# Patient Record
Sex: FEMALE | Race: WHITE | NOT HISPANIC OR LATINO | Employment: FULL TIME | URBAN - METROPOLITAN AREA
[De-identification: names, ages, dates, MRNs, and addresses within clinical notes are randomized per-mention and may not be internally consistent; named-entity substitution may affect disease eponyms.]

---

## 2017-06-01 ENCOUNTER — TRANSFERRED RECORDS (OUTPATIENT)
Dept: HEALTH INFORMATION MANAGEMENT | Facility: CLINIC | Age: 47
End: 2017-06-01

## 2017-06-01 LAB — PAP SMEAR - HIM PATIENT REPORTED: NEGATIVE

## 2017-07-26 ENCOUNTER — TRANSFERRED RECORDS (OUTPATIENT)
Dept: HEALTH INFORMATION MANAGEMENT | Facility: CLINIC | Age: 47
End: 2017-07-26

## 2017-07-26 LAB
PAP-ABSTRACT: NORMAL
PHQ9 SCORE: 11
TSH SERPL-ACNC: 4.32 MIU/ML (ref 0.55–4.78)

## 2017-11-29 ENCOUNTER — OFFICE VISIT (OUTPATIENT)
Dept: FAMILY MEDICINE | Facility: CLINIC | Age: 47
End: 2017-11-29
Payer: COMMERCIAL

## 2017-11-29 VITALS
HEART RATE: 99 BPM | SYSTOLIC BLOOD PRESSURE: 106 MMHG | HEIGHT: 64 IN | TEMPERATURE: 98.3 F | DIASTOLIC BLOOD PRESSURE: 69 MMHG | OXYGEN SATURATION: 97 % | BODY MASS INDEX: 35.89 KG/M2 | WEIGHT: 210.2 LBS

## 2017-11-29 DIAGNOSIS — G56.03 BILATERAL CARPAL TUNNEL SYNDROME: ICD-10-CM

## 2017-11-29 DIAGNOSIS — M76.62 ACHILLES TENDONITIS, BILATERAL: ICD-10-CM

## 2017-11-29 DIAGNOSIS — F43.10 PTSD (POST-TRAUMATIC STRESS DISORDER): ICD-10-CM

## 2017-11-29 DIAGNOSIS — M76.61 ACHILLES TENDONITIS, BILATERAL: ICD-10-CM

## 2017-11-29 DIAGNOSIS — F32.0 MILD MAJOR DEPRESSION (H): Primary | ICD-10-CM

## 2017-11-29 DIAGNOSIS — F41.9 ANXIETY: ICD-10-CM

## 2017-11-29 PROCEDURE — 99214 OFFICE O/P EST MOD 30 MIN: CPT | Performed by: PHYSICIAN ASSISTANT

## 2017-11-29 ASSESSMENT — ANXIETY QUESTIONNAIRES
1. FEELING NERVOUS, ANXIOUS, OR ON EDGE: MORE THAN HALF THE DAYS
IF YOU CHECKED OFF ANY PROBLEMS ON THIS QUESTIONNAIRE, HOW DIFFICULT HAVE THESE PROBLEMS MADE IT FOR YOU TO DO YOUR WORK, TAKE CARE OF THINGS AT HOME, OR GET ALONG WITH OTHER PEOPLE: VERY DIFFICULT
5. BEING SO RESTLESS THAT IT IS HARD TO SIT STILL: NEARLY EVERY DAY
6. BECOMING EASILY ANNOYED OR IRRITABLE: NEARLY EVERY DAY
GAD7 TOTAL SCORE: 19
7. FEELING AFRAID AS IF SOMETHING AWFUL MIGHT HAPPEN: MORE THAN HALF THE DAYS
2. NOT BEING ABLE TO STOP OR CONTROL WORRYING: NEARLY EVERY DAY
3. WORRYING TOO MUCH ABOUT DIFFERENT THINGS: NEARLY EVERY DAY

## 2017-11-29 ASSESSMENT — PATIENT HEALTH QUESTIONNAIRE - PHQ9
5. POOR APPETITE OR OVEREATING: NEARLY EVERY DAY
SUM OF ALL RESPONSES TO PHQ QUESTIONS 1-9: 21

## 2017-11-29 NOTE — LETTER
My Depression Action Plan  Name: Elly Golden   Date of Birth 1970  Date: 11/29/2017    My doctor: Guille [facility] [place]   My clinic: [facility] [address] [id number] [phone]          GREEN    ZONE   Good Control    What it looks like:     Things are going generally well. You have normal up s and down s. You may even feel depressed from time to time, but bad moods usually last less than a day.   What you need to do:  1. Continue to care for yourself (see self care plan)  2. Check your depression survival kit and update it as needed  3. Follow your physician s recommendations including any medication.  4. Do not stop taking medication unless you consult with your physician first.           YELLOW         ZONE Getting Worse    What it looks like:     Depression is starting to interfere with your life.     It may be hard to get out of bed; you may be starting to isolate yourself from others.    Symptoms of depression are starting to last most all day and this has happened for several days.     You may have suicidal thoughts but they are not constant.   What you need to do:     1. Call your care team, your response to treatment will improve if you keep your care team informed of your progress. Yellow periods are signs an adjustment may need to be made.     2. Continue your self-care, even if you have to fake it!    3. Talk to someone in your support network    4. Open up your depression survival kit           RED    ZONE Medical Alert - Get Help    What it looks like:     Depression is seriously interfering with your life.     You may experience these or other symptoms: You can t get out of bed most days, can t work or engage in other necessary activities, you have trouble taking care of basic hygiene, or basic responsibilities, thoughts of suicide or death that will not go away, self-injurious behavior.     What you  need to do:  1. Call your care team and request a same-day appointment. If they are not available (weekends or after hours) call your local crisis line, emergency room or 911.      Electronically signed by: Esther Nichole, November 29, 2017    Depression Self Care Plan / Survival Kit    Self-Care for Depression  Here s the deal. Your body and mind are really not as separate as most people think.  What you do and think affects how you feel and how you feel influences what you do and think. This means if you do things that people who feel good do, it will help you feel better.  Sometimes this is all it takes.  There is also a place for medication and therapy depending on how severe your depression is, so be sure to consult with your medical provider and/ or Behavioral Health Consultant if your symptoms are worsening or not improving.     In order to better manage my stress, I will:    Exercise  Get some form of exercise, every day. This will help reduce pain and release endorphins, the  feel good  chemicals in your brain. This is almost as good as taking antidepressants!  This is not the same as joining a gym and then never going! (they count on that by the way ) It can be as simple as just going for a walk or doing some gardening, anything that will get you moving.      Hygiene   Maintain good hygiene (Get out of bed in the morning, Make your bed, Brush your teeth, Take a shower, and Get dressed like you were going to work, even if you are unemployed).  If your clothes don't fit try to get ones that do.    Diet  I will strive to eat foods that are good for me, drink plenty of water, and avoid excessive sugar, caffeine, alcohol, and other mood-altering substances.  Some foods that are helpful in depression are: complex carbohydrates, B vitamins, flaxseed, fish or fish oil, fresh fruits and vegetables.    Psychotherapy  I agree to participate in Individual Therapy (if recommended).    Medication  If prescribed  medications, I agree to take them.  Missing doses can result in serious side effects.  I understand that drinking alcohol, or other illicit drug use, may cause potential side effects.  I will not stop my medication abruptly without first discussing it with my provider.    Staying Connected With Others  I will stay in touch with my friends, family members, and my primary care provider/team.    Use your imagination  Be creative.  We all have a creative side; it doesn t matter if it s oil painting, sand castles, or mud pies! This will also kick up the endorphins.    Witness Beauty  (AKA stop and smell the roses) Take a look outside, even in mid-winter. Notice colors, textures. Watch the squirrels and birds.     Service to others  Be of service to others.  There is always someone else in need.  By helping others we can  get out of ourselves  and remember the really important things.  This also provides opportunities for practicing all the other parts of the program.    Humor  Laugh and be silly!  Adjust your TV habits for less news and crime-drama and more comedy.    Control your stress  Try breathing deep, massage therapy, biofeedback, and meditation. Find time to relax each day.     My support system    Clinic Contact:  Phone number:    Contact 1:  Phone number:    Contact 2:  Phone number:    [employer]/:  Phone number:    Therapist:  Phone number:    [facility] crisis center:    Phone number:    Other community support:  Phone number:

## 2017-11-29 NOTE — NURSING NOTE
"Chief Complaint   Patient presents with     New Patient     Musculoskeletal Problem       Initial /69 (BP Location: Left arm, Patient Position: Chair, Cuff Size: Adult Large)  Pulse 99  Temp 98.3  F (36.8  C) (Oral)  Ht 5' 4.17\" (1.63 m)  Wt 210 lb 3.2 oz (95.3 kg)  SpO2 97%  Breastfeeding? No  BMI 35.89 kg/m2 Estimated body mass index is 35.89 kg/(m^2) as calculated from the following:    Height as of this encounter: 5' 4.17\" (1.63 m).    Weight as of this encounter: 210 lb 3.2 oz (95.3 kg).  Medication Reconciliation: complete     LYNDA Roman MA      "

## 2017-11-29 NOTE — PROGRESS NOTES
"  SUBJECTIVE:   Elly Golden is a 47 year old female who presents to clinic today for the following health issues:      Joint Pain    Onset: Since summer    Description:   Location: right elbow, both hands  Character: Sharp, Dull ache and Cramping    Intensity: mild, moderate, severe    Progression of Symptoms: worse    Accompanying Signs & Symptoms:  Other symptoms: numbness, tingling and weakness of both hands    History:   Previous similar pain: no       Precipitating factors:   Trauma or overuse: no     Alleviating factors:  Improved by: nothing    Therapies Tried and outcome: Pt has been icing but does not seem to help      Patient is a new patient to the clinic here today.    Has had joint pain in general, but has been worsening.   She is losing her  \"I am losing my hands\" Even trying to lift her books up with her hands is difficult. Hard to life a mug at times.   Numbness/tingling at times. She does sleep with her hands clenched into her body. Hard to do anything over her head. Minimal pain- usually more near the elbow than the hands.   Hands feel stiff first thing in the morning.   Has a history of having CTS.   Hasn't taken anything for the pain.     She also notes that she has trouble walking at times, feet are stiff. There is pain around the achilles tendon. Feels like there are nodules near the achilles. Worsens if she walks a lot. Trying different shoes. Has never done any physical therapy.      Has therapy animal (a chicken).  Not in counseling right now. Had been on wellbutrin and zoloft in the past and did not like how she felt on it. \"I felt paranoid\". Didn't like how she felt.     Has been homeless since 12/2016. Had spent the summer living in a tent with her . Now moved up to the [facility]. She is currently \"nelsy it\" at a friends 1 bedroom [facility].  gets food stamps and does visit [facility] [place].     Normal pap smear 6/2017 in [place].       Problem list and histories reviewed & " "adjusted, as indicated.  Additional history: as documented    Patient Active Problem List   Diagnosis     Anxiety     PTSD (post-traumatic stress disorder)     Mild major depression (H)     Past Surgical History:   Procedure Laterality Date     GYN SURGERY      C section x2       Social History   Substance Use Topics     Smoking status: Never Smoker     Smokeless tobacco: Never Used     Alcohol use No     Family History   Problem Relation Age of Onset     Unknown/Adopted Mother      Unknown/Adopted Father      Coronary Artery Disease Maternal Grandfather      Hypertension Maternal Grandfather              Reviewed and updated as needed this visit by clinical staffTobacco  Allergies  Meds  Problems  Med Hx  Surg Hx  Fam Hx  Soc Hx        Reviewed and updated as needed this visit by Provider  Tobacco  Allergies  Meds  Problems  Med Hx  Surg Hx  Fam Hx  Soc Hx          ROS:  Constitutional, HEENT, cardiovascular, pulmonary, gi and gu systems are negative, except as otherwise noted.      OBJECTIVE:   /69 (BP Location: Left arm, Patient Position: Chair, Cuff Size: Adult Large)  Pulse 99  Temp 98.3  F (36.8  C) (Oral)  Ht 5' 4.17\" (1.63 m)  Wt 210 lb 3.2 oz (95.3 kg)  SpO2 97%  Breastfeeding? No  BMI 35.89 kg/m2  Body mass index is 35.89 kg/(m^2).  GENERAL: healthy, alert and no distress  RESP: lungs clear to auscultation - no rales, rhonchi or wheezes  CV: regular rate and rhythm, normal S1 S2, no S3 or S4, no murmur, click or rub,   MS: no gross musculoskeletal defects noted, no edema- strength 4/5 bilaterally. Positive Tinel's sign bilaterally. Positive Phalen's test. full range of motion  Pain with palpation at the insertion of the bilateral achilles tendon. Normal gait. No swelling in the feet.   SKIN: no suspicious lesions or rashes  NEURO: Normal strength and tone, mentation intact and speech normal  Psych: pressured speech, anxious manner, tapping of leg.     Diagnostic Test " Results:  none     ASSESSMENT/PLAN:       ICD-10-CM    1. Mild major depression (H) F32.0 MENTAL HEALTH REFERRAL  - Adult; Outpatient Treatment; Individual/Couples/Family/Group Therapy/Health Psychology; [facility] [place]: [facility] [phone]; The scheduling team will contact you to schedule your appointment.  If you have any ...     CARE COORDINATION REFERRAL   2. Anxiety F41.9 MENTAL HEALTH REFERRAL  - Adult; Outpatient Treatment; Individual/Couples/Family/Group Therapy/Health Psychology; [facility] [place]: [facility] [phone]; The scheduling team will contact you to schedule your appointment.  If you have any ...     CARE COORDINATION REFERRAL   3. PTSD (post-traumatic stress disorder) F43.10 MENTAL HEALTH REFERRAL  - Adult; Outpatient Treatment; Individual/Couples/Family/Group Therapy/Health Psychology; [facility] [place]: [facility] [phone]; The scheduling team will contact you to schedule your appointment.  If you have any ...     CARE COORDINATION REFERRAL   4. Achilles tendonitis, bilateral M76.61 [facility] PT, HAND, AND CHIROPRACTIC REFERRAL    M76.62    5. Bilateral carpal tunnel syndrome G56.03 order for DME   Patient prefers to start with counseling for anxiety and depression.   Physical therapy for the achilles tendonitis.   Wear wrist splints at night for 6 weeks, if not improving could consider a surgical consult.     FUTURE APPOINTMENTS:       - 4-6 weeks if not improving with braces and therapy.     Esther Nichole PA-C  [facility]

## 2017-11-29 NOTE — MR AVS SNAPSHOT
After Visit Summary   11/29/2017    Elly Golden    MRN: [id number]           Patient Information     Date Of Birth          1970        Visit Information        Provider Department      11/29/2017 9:40 AM Esther Nichole PA-C [facility]        Today's Diagnoses     Mild major depression (H)    -  1    Anxiety        PTSD (post-traumatic stress disorder)        Achilles tendonitis, bilateral        Bilateral carpal tunnel syndrome          Care Instructions    Wear wrist splints at night.     Schedule with physical therapy.     Schedule for counseling.           Follow-ups after your visit        Additional Services     [facility] PT, HAND, AND CHIROPRACTIC REFERRAL       **This order will print in the [facility] Scheduling Office**    Physical Therapy, Hand Therapy and Chiropractic Care are available through:    *[facility]  *[facility]  *[facility]    Call one number to schedule at any of the above locations: [phone].    Your provider has referred you to: Physical Therapy at [facility] or [facility]    Indication/Reason for Referral: Bilateral achilles tendonitis.   Onset of Illness: months  Therapy Orders: Evaluate and Treat  Special Programs: None  Special Request: Britany Das      Additional Comments for the Therapist or Chiropractor:     Please be aware that coverage of these services is subject to the terms and limitations of your health insurance plan.  Call member services at your health plan with any benefit or coverage questions.      Please bring the following to your appointment:    *Your personal calendar for scheduling future appointments  *Comfortable clothing            MENTAL HEALTH REFERRAL  - Adult; Outpatient Treatment; Individual/Couples/Family/Group Therapy/Health Psychology; [facility] [place] [place]: [facility] [phone]; The scheduling team will contact you to schedule your appointment.  If you have  "any ...       All scheduling is subject to the client's specific insurance plan & benefits, provider/location availability, and provider clinical specialities.  Please arrive 15 minutes early for your first appointment and bring your completed paperwork.    Please be aware that coverage of these services is subject to the terms and limitations of your health insurance plan.  Call member services at your health plan with any benefit or coverage questions.                            Who to contact     If you have questions or need follow up information about today's clinic visit or your schedule please contact [facility] directly at [phone].  Normal or non-critical lab and imaging results will be communicated to you by Bustlehart, letter or phone within 4 business days after the clinic has received the results. If you do not hear from us within 7 days, please contact the clinic through Snootlabt or phone. If you have a critical or abnormal lab result, we will notify you by phone as soon as possible.  Submit refill requests through Tripvisto or call your pharmacy and they will forward the refill request to us. Please allow 3 business days for your refill to be completed.          Additional Information About Your Visit        BustleharClickyreserva Information     Tripvisto lets you send messages to your doctor, view your test results, renew your prescriptions, schedule appointments and more. To sign up, go to [email or web address] . Click on \"Log in\" on the left side of the screen, which will take you to the Welcome page. Then click on \"Sign up Now\" on the right side of the page.     You will be asked to enter the access code listed below, as well as some personal information. Please follow the directions to create your username and password.     Your access code is: [email or web address]  Expires: 2018 10:23 AM     Your access code will  in 90 days. If you need help or a new code, please call your [facility] " "clinic or [phone].        Care EveryWhere ID     This is your Care EveryWhere ID. This could be used by other organizations to access your [facility] medical records  [id number]        Your Vitals Were     Pulse Temperature Height Pulse Oximetry Breastfeeding? BMI (Body Mass Index)    99 98.3  F (36.8  C) (Oral) 5' 4.17\" (1.63 m) 97% No 35.89 kg/m2       Blood Pressure from Last 3 Encounters:   11/29/17 106/69    Weight from Last 3 Encounters:   11/29/17 210 lb 3.2 oz (95.3 kg)              We Performed the Following     [facility] PT, HAND, AND CHIR[facility]RACTIC REFERRAL     MENTAL HEALTH REFERRAL  - Adult; Outpatient Treatment; Individual/Couples/Family/Group Therapy/Health Psychology; [facility]: [facility] [phone]; The scheduling team will contact you to schedule your appointment.  If you have any ...          Today's Medication Changes          These changes are accurate as of: 11/29/17 10:23 AM.  If you have any questions, ask your nurse or doctor.               Start taking these medicines.        Dose/Directions    order for DME   Used for:  Bilateral carpal tunnel syndrome   Started by:  Esther Nichole PA-C        Equipment being ordered: Bilateral wrist splints-no thumb.   Quantity:  2 each   Refills:  0            Where to get your medicines      Some of these will need a paper prescription and others can be bought over the counter.  Ask your nurse if you have questions.     Bring a paper prescription for each of these medications     order for DME                Primary Care Provider Office Phone # Fax #    [facility] [phone]       [address] [place] [id number]        Equal Access to Services     LOUISA LLOYD : Corine ordoñez Sosaman, waaxda luqadaha, qaybta kaalmacharlotte perry. So [facility] [phone].    ATENCIÓN: Si habla español, tiene a cordova disposición servicios gratuitos de asistencia " lingüística. Opal al [phone].    We comply with applicable federal civil rights laws and [place] laws. We do not discriminate on the basis of race, color, national origin, age, disability, sex, sexual orientation, or gender identity.            Thank you!     Thank you for choosing [facility]  for your care. Our goal is always to provide you with excellent care. Hearing back from our patients is one way we can continue to improve our services. Please take a few minutes to complete the written survey that you may receive in the mail after your visit with us. Thank you!             Your Updated Medication List - Protect others around you: Learn how to safely use, store and throw away your medicines at www.disposemymeds.org.          This list is accurate as of: 11/29/17 10:23 AM.  Always use your most recent med list.                   Brand Name Dispense Instructions for use Diagnosis    levonorgestrel 20 MCG/24HR IUD    MIRENA     1 each by Intrauterine route once        order for DME     2 each    Equipment being ordered: Bilateral wrist splints-no thumb.    Bilateral carpal tunnel syndrome

## 2017-11-30 ASSESSMENT — ANXIETY QUESTIONNAIRES: GAD7 TOTAL SCORE: 19

## 2017-12-05 ENCOUNTER — CARE COORDINATION (OUTPATIENT)
Dept: CARE COORDINATION | Facility: CLINIC | Age: 47
End: 2017-12-05

## 2017-12-05 NOTE — PROGRESS NOTES
Clinic Care Coordination Contact--Social Work Initial Call/Assessment  Care Team Conversations    Psychosocial: Per chart review, Patient has been homeless since December of 2016.  She currently sleeps on floor or friends 1 bed room condo, her spouse obtains food stamps and they visit [place].  She was living in a tent this past summer with her .  Patient has a therapy animal, a chicken.  She is not currently in counseling but is accepting for anxiety and depression.  A mental health referral done by provider.  Patient had reported she did not like taking Zoloft and Wellbutrin which she had taken in the past.      Current Medical Concerns/Status: Patient had reported worsening joint pain.  She had reported she is losing her  and holding a mug is often difficult.  She had reported intermittent numbness and tingling in both hands, that her hands are stiff first thing in the morning and that she has difficulty walking at times due to stiffness in her feet.  Patient reported she has not done PT and is not taking pain medications.  Patient reported that using ice has not been helpful.  Provider referred Patient to PT and to wear splint at night for 6 weeks.  If no improvement in 4-6 weeks, surgical consult is recommended.    Call to Patient for needs assessment ([phone]), per the , this # has been disconnected or is not in service.  Patient is homeless, SW will not send introduction letter until verification of current residence/mailing address     Plan: 1) SW will call Patient in 3-5 business days for introduction and needs assessment     GINGER Dolan, MSW   [phone]  12/5/2017 5:00 PM    Clinic Care Coordination Contact--Social Work Initial Call/Assessment  Care Team Conversations    Psychosocial: Per chart review, Patient has been homeless since December of 2016.  She currently sleeps on floor or friends 1 bed room condo, her spouse obtains food stamps and they visit [employer]  food [employer].  She was living in a tent this past summer with her .  Patient has a therapy animal, a chicken.  She is not currently in counseling but is accepting for anxiety and depression.  A mental health referral done by provider.  Patient had reported she did not like taking Zoloft and Wellbutrin which she had taken in the past.      Current Medical Concerns/Status: Patient had reported worsening joint pain.  She had reported she is losing her  and holding a mug is often difficult.  She had reported intermittent numbness and tingling in both hands, that her hands are stiff first thing in the morning and that she has difficulty walking at times due to stiffness in her feet.  Patient reported she has not done PT and is not taking pain medications.  Patient reported that using ice has not been helpful.  Provider referred Patient to PT and to wear splint at night for 6 weeks.  If no improvement in 4-6 weeks, surgical consult is recommended.    Second call to Patient for needs assessment ([phone]), per the , this # has been disconnected or is not in service.  Patient is homeless, SW will not send introduction letter until verification of current residence/mailing address     Plan: 1) SW has been unable to reach Patient, cannot send letter as Patient is technically homeless.  SW will not make further attempt to contact Patient, will update PCP and close to Care Coordination     Karen Florence, GINGER, MSW   [phone]  12/20/2017 4:54 PM

## 2017-12-05 NOTE — LETTER
Health Care Home - Access Care Plan    About Me  Patient Name:  Elly Cardenas    YOB: 1970  Age:                            47 year old   [place] MRN:         [id number] Telephone Information:     Home Phone [phone]   Mobile Not on file.       Address:    [address] [place] [address] Email address:  [email or web address]      Emergency Contact(s)  Name Relationship Lgl Grd Work Phone Home Phone Mobile Phone   1. CORAZON CARDENAS Significant ot*   [phone]    2. NO SECONDARY C*    home              Health Maintenance: Routine Health maintenance Reviewed: Not assessed    My Access Plan  Medical Emergency 911   Questions or concerns during clinic hours Primary Clinic Line, I will call the clinic directly: Primary Clinic:  (unknown )   24 Hour Appointment Line [phone] or  [phone])  (toll free)   24 Hour Nurse Line [phone] (toll free)   Questions or concerns outside clinic hours 24 Hour Appointment Line, I will call the after-hours on-call line:   [place] [phone] or [phone]) (toll-free)   Preferred Urgent Care     Preferred Hospital Preferred Hospital:  (unknown)   Preferred Pharmacy No Pharmacies Listed   Behavioral Health Crisis Line The National Suicide Prevention Lifeline at 1-833.762.1449 or 079     My Care Team Members  Patient Care Team       Relationship Specialty Notifications Start End    [facility] PCP - General Clinic  11/28/17     Phone: [phone] Fax: [phone]         [address] [place] [address]    Esther Nichole PA-C Physician Assistant Physician Assistant - Medical  12/5/17     Phone: [phone] Fax: [phone]         [address] [facility] [address]    Karen Florence [facility] Care Coordinator  - Clinical  12/5/17     Phone: [phone] Fax: [phone]            My Medical and Care Information  Problem  List   Patient Active Problem List   Diagnosis     Anxiety     PTSD (post-traumatic stress disorder)     Mild major depression (H)

## 2017-12-05 NOTE — LETTER
[facility] CARE COORDINATION  [address] [facility] [address]  Phone: [phone]      December 5, 2017      Elly Golden  [address]   [facility] [address]    Dear Elly,  I am the Clinic Care Coordinator that works with your primary care provider's clinic. I recently tried to call and was unable to reach you. Below is a description of what Clinic Care Coordination is and how I can further assist you.     The Clinic Care Coordinator role is a Registered Nurse and/or  who understands the health care system. The goal of Clinic Care Coordination is to help you manage your health and improve access to the [facility] in the most efficient manner.  The Registered Nurse can assist you in meeting your health care goals by providing education, coordinating services, and strengthening the communication among your providers. The  can assist you with financial, behavioral, psychosocial, and chemical dependency and counseling/psychiatric resources.    Please feel free to keep this letter and contact information to contact me at [phone] with any further questions or concerns that may arise. We at [facility] are focused on providing you with the highest-quality healthcare experience possible and that all starts with you.       Sincerely,     Karen Florence, GINGER, MSW    Clinical Care Coordination  [facility]  [phone]    Enclosed: I have enclosed a copy of a 24 Hour Access Plan. This has helpful phone numbers for you to call when needed. Please keep this in an easy to access place to use as needed.

## 2017-12-13 ENCOUNTER — THERAPY VISIT (OUTPATIENT)
Dept: PHYSICAL THERAPY | Facility: CLINIC | Age: 47
End: 2017-12-13
Payer: COMMERCIAL

## 2017-12-13 DIAGNOSIS — M25.579 PAIN IN JOINT, ANKLE AND FOOT, UNSPECIFIED LATERALITY: Primary | ICD-10-CM

## 2017-12-13 PROCEDURE — 97161 PT EVAL LOW COMPLEX 20 MIN: CPT | Mod: GP | Performed by: PHYSICAL THERAPIST

## 2017-12-13 PROCEDURE — 97110 THERAPEUTIC EXERCISES: CPT | Mod: GP | Performed by: PHYSICAL THERAPIST

## 2017-12-13 NOTE — PROGRESS NOTES
[facility] Initial Evaluation      Subjective:    Patient is a 47 year old female presenting with rehab right ankle/foot hpi. The history is provided by the patient.   Elly Golden is a 47 year old female with a bilateral ankles (R > L ) condition.  Condition occurred with:  A fall/slip and insidious onset.  Condition occurred: in the community.  This is a chronic condition  May / June.  Sprained R ankle.  Have dislocated R ankle before.  MD referral 12/5/2017..    Patient reports pain:  Posterior.  Radiates to:  Foot.  Pain is described as aching and sharp and is intermittent and reported as 3/10.  Associated symptoms:  Loss of motion/stiffness. Pain is worse in the P.M..  Symptoms are exacerbated by weight bearing, standing, ascending stairs, descending stairs, walking and bending/squatting and relieved by rest and other (shoes without backs).  Since onset symptoms are unchanged.        General health as reported by patient is good.  Pertinent medical history includes:  Mental illness and depression.  Medical allergies: yes (latex, augmentin).  Other surgeries include:  Other (2 c-sections).  Current medications:  None as reported by patient.  Current occupation is none.    Primary job tasks include:  Prolonged sitting and lifting (computer;  housework).    Barriers include:  None as reported by patient.    Red flags:  None as reported by patient.                        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  LIGAMENT TESTING: normal                PALPATION:     Left ankle tenderness not present at:   achilles tendon  Right ankle tenderness present at:   achilles tendon  EDEMA: Edema ankle: distal  achilles.                                                              General     ROS    Assessment/Plan:      Patient is a 47 year old female with both sides ankle complaints.    Patient has the following significant findings with corresponding treatment plan.                Diagnosis 1:  bilat achilles tendonitis  Pain -  hot/cold therapy, US, manual therapy, self management, education, directional preference exercise and home program  Decreased ROM/flexibility - manual therapy and therapeutic exercise  Inflammation - cold therapy and US  Decreased function - therapeutic activities    Therapy Evaluation Codes:   1) History comprised of:   Personal factors that impact the plan of care:      None.    Comorbidity factors that impact the plan of care are:      Depression and Mental illness.     Medications impacting care: None.  2) Examination of Body Systems comprised of:   Body structures and functions that impact the plan of care:      Ankle.   Activity limitations that impact the plan of care are:      Walking.  3) Clinical presentation characteristics are:   Stable/Uncomplicated.  4) Decision-Making    Low complexity using standardized patient assessment instrument and/or measureable assessment of functional outcome.  Cumulative Therapy Evaluation is: Low complexity.    Previous and current functional limitations:  (See Goal Flow Sheet for this information)    Short term and Long term goals: (See Goal Flow Sheet for this information)     Communication ability:  Patient appears to be able to clearly communicate and understand verbal and written communication and follow directions correctly.  Treatment Explanation - The following has been discussed with the patient:   RX ordered/plan of care  Anticipated outcomes  Possible risks and side effects  This patient would benefit from PT intervention to resume normal activities.   Rehab potential is good.    Frequency:  1 X week, once daily  Duration:  for 8  weeks  Discharge Plan:  Achieve all LTG.  Independent in home treatment program.  Reach maximal therapeutic benefit.    Please refer to the daily flowsheet for treatment today, total treatment time and time spent performing 1:1 timed codes.

## 2017-12-13 NOTE — MR AVS SNAPSHOT
After Visit Summary   12/13/2017    Elly Golden    MRN: [id number]           Patient Information     Date Of Birth          1970        Visit Information        Provider Department      12/13/2017 11:00 AM Boston Mack, PT [facility] [place] [facility]        Today's Diagnoses     Pain in joint, ankle and foot, unspecified laterality    -  1       Follow-ups after your visit        Your next 10 appointments already scheduled     Dec 19, 2017  2:40 PM CST   SHORT with Esther Nichole PA-C   [facility]    [address] [facility] [address]   [phone]            Dec 20, 2017 12:50 PM CST   KORY Extremity with Boston Mack PT   [facility] [place] [facility] ([facility])    [address] [facility] [address] [phone]            Jan 04, 2018  1:30 PM CST   [facility] with Alex Krue[facility]    [address] [facility] [address] [phone]              Who to contact     If you have questions or need follow up information about today's clinic visit or your schedule please contact [facility] directly at [phone].  Normal or non-critical lab and imaging results will be communicated to you by MyChart, letter or phone within 4 business days after the clinic has received the results. If you do not hear from us within 7 days, please contact the clinic through MyChart or phone. If you have a critical or abnormal lab result, we will notify you by phone as soon as possible.  Submit refill requests through [employer] or call your pharmacy and they will forward the refill request to us. Please allow 3 business days for your refill to be completed.          Additional Information About  Your Visit        [employer] Information     [employer] gives you secure access to your electronic health record. If you see a primary care provider, you can also send messages to your care team and make appointments. If you have questions, please call your primary care clinic.  If you do not have a primary care provider, please call [phone] and they will assist you.        Care EveryWhere ID     This is your Care EveryWhere ID. This could be used by other organizations to access your [facility] medical records  [id number]         Blood Pressure from Last 3 Encounters:   11/29/17 106/69    Weight from Last 3 Encounters:   11/29/17 95.3 kg (210 lb 3.2 oz)              We Performed the Following     HC PT EVAL, LOW COMPLEXITY     KORY INITIAL EVAL REPORT     THERAPEUTIC EXERCISES        Primary Care Provider Office Phone # Fax #    [facility] [phone]       [address] [place] [address]        Equal Access to Services     FELECIA LLOYD : Hadii aad ku hadasho Soomaali, waaxda luqadaha, qaybta kaalmada adeegyada, waxay idiin hayaan agustín bowden . So [facility] [phone].    ATENCIÓN: Si habla español, tiene a cordova disposición servicios gratuitos de asistencia lingüística. Chrisame al [phone].    We comply with applicable federal civil rights laws and [place] laws. We do not discriminate on the basis of race, color, national origin, age, disability, sex, sexual orientation, or gender identity.            Thank you!     Thank you for choosing [facility]  for your care. Our goal is always to provide you with excellent care. Hearing back from our patients is one way we can continue to improve our services. Please take a few minutes to complete the written survey that you may receive in the mail after your visit with us. Thank you!             Your Updated Medication List - Protect others around you: Learn how to safely  use, store and throw away your medicines at www.disposemymeds.org.          This list is accurate as of: 12/13/17 11:46 AM.  Always use your most recent med list.                   Brand Name Dispense Instructions for use Diagnosis    levonorgestrel 20 MCG/24HR IUD    MIRENA     1 each by Intrauterine route once        order for DME     2 each    Equipment being ordered: Bilateral wrist splints-no thumb.    Bilateral carpal tunnel syndrome

## 2017-12-19 ENCOUNTER — OFFICE VISIT (OUTPATIENT)
Dept: FAMILY MEDICINE | Facility: CLINIC | Age: 47
End: 2017-12-19
Payer: COMMERCIAL

## 2017-12-19 VITALS
TEMPERATURE: 98 F | WEIGHT: 212 LBS | DIASTOLIC BLOOD PRESSURE: 65 MMHG | OXYGEN SATURATION: 98 % | BODY MASS INDEX: 36.19 KG/M2 | SYSTOLIC BLOOD PRESSURE: 99 MMHG | HEART RATE: 65 BPM

## 2017-12-19 DIAGNOSIS — G56.03 BILATERAL CARPAL TUNNEL SYNDROME: Primary | ICD-10-CM

## 2017-12-19 DIAGNOSIS — F43.10 PTSD (POST-TRAUMATIC STRESS DISORDER): ICD-10-CM

## 2017-12-19 DIAGNOSIS — F32.0 MILD MAJOR DEPRESSION (H): ICD-10-CM

## 2017-12-19 PROCEDURE — 99213 OFFICE O/P EST LOW 20 MIN: CPT | Performed by: PHYSICIAN ASSISTANT

## 2017-12-19 ASSESSMENT — ANXIETY QUESTIONNAIRES
1. FEELING NERVOUS, ANXIOUS, OR ON EDGE: MORE THAN HALF THE DAYS
5. BEING SO RESTLESS THAT IT IS HARD TO SIT STILL: MORE THAN HALF THE DAYS
6. BECOMING EASILY ANNOYED OR IRRITABLE: NEARLY EVERY DAY
3. WORRYING TOO MUCH ABOUT DIFFERENT THINGS: NEARLY EVERY DAY
2. NOT BEING ABLE TO STOP OR CONTROL WORRYING: NEARLY EVERY DAY
GAD7 TOTAL SCORE: 17
IF YOU CHECKED OFF ANY PROBLEMS ON THIS QUESTIONNAIRE, HOW DIFFICULT HAVE THESE PROBLEMS MADE IT FOR YOU TO DO YOUR WORK, TAKE CARE OF THINGS AT HOME, OR GET ALONG WITH OTHER PEOPLE: VERY DIFFICULT
7. FEELING AFRAID AS IF SOMETHING AWFUL MIGHT HAPPEN: MORE THAN HALF THE DAYS

## 2017-12-19 ASSESSMENT — PATIENT HEALTH QUESTIONNAIRE - PHQ9
5. POOR APPETITE OR OVEREATING: MORE THAN HALF THE DAYS
SUM OF ALL RESPONSES TO PHQ QUESTIONS 1-9: 18

## 2017-12-19 NOTE — PROGRESS NOTES
"  SUBJECTIVE:   Elly Golden is a 47 year old female who presents to clinic today for the following health issues:    Patient is here to follow up on her arms. Pt states that it has not gotten better and the braces does not help at all.    Wakes up in the night and her elbows are throbbing and she has to take off the braces. Pain in both elbows, but more in the right. Difficult to  items - hurts in the elbow. Pain in \"soft tissue and in the join\". Pain 2-3/10, says she tolerates pain well. Has not tried ibuprofen or tylenol. Has not tried heat or ice.   Pain is worse when braces are one. Stiff and sore in the morning and continues to get worse throughout the day.     Has appointment schedule for counseling on January 4th. Still with housing concerns particularly d/t her emotional support rooster.     Problem list and histories reviewed & adjusted, as indicated.  Additional history: as documented    Patient Active Problem List   Diagnosis     Anxiety     PTSD (post-traumatic stress disorder)     Mild major depression (H)     Pain in joint, ankle and foot, unspecified laterality     Past Surgical History:   Procedure Laterality Date     GYN SURGERY      C section x2       Social History   Substance Use Topics     Smoking status: Never Smoker     Smokeless tobacco: Never Used     Alcohol use No     Family History   Problem Relation Age of Onset     Unknown/Adopted Mother      Unknown/Adopted Father      Coronary Artery Disease Maternal Grandfather      Hypertension Maternal Grandfather        Reviewed and updated as needed this visit by clinical staffTobacco  Allergies  Meds  Med Hx  Surg Hx  Fam Hx  Soc Hx      Reviewed and updated as needed this visit by Provider       ROS:  Constitutional, HEENT, cardiovascular, pulmonary, gi and gu systems are negative, except as otherwise noted.  OBJECTIVE:   BP 99/65 (BP Location: Left arm, Patient Position: Chair, Cuff Size: Adult Large)  Pulse 65  Temp 98  F " (36.7  C) (Oral)  Wt 212 lb (96.2 kg)  SpO2 98%  BMI 36.19 kg/m2  Body mass index is 36.19 kg/(m^2).  GENERAL: healthy, alert and no distress  MS: no gross musculoskeletal defects noted, no edema  MS: Upper extremity pain with palpation of lateral epicondyles bilaterally. Positive Phalen's and Tinel's test bilaterally. Strength 4/5 in hands bilaterally. ROM not limited by pain.   SKIN: no suspicious lesions or rashes  NEURO: Normal strength and tone, mentation intact and speech normal    Diagnostic Test Results:  none   ASSESSMENT/PLAN:       ICD-10-CM    1. Bilateral carpal tunnel syndrome G56.03 NEUROLOGY ADULT REFERRAL   2. PTSD (post-traumatic stress disorder) F43.10 CARE COORDINATION REFERRAL   3. Mild major depression (H) F32.0 CARE COORDINATION REFERRAL   Referral to neurology for EMG to help in management of carpal tunnel syndrome. Patient educated on benefits of wearing braces, but wanted to stop due to the increased elbow pain when wearing the. Discussed next steps and patient will schedule for the EMG.   Patient has scheduled an appointment for January 4th to meet with a counselor. Patient encouraged to talk with clinic , she was given her number to call.    CONSULTATION/REFERRAL to Neurology    JOSEF Donohue PA-C  [facility]  The student Tess GARCIA acted as a scribe and the encounter documented above was completely performed by myself and the documentation reflects the work I have performed today.   Esther Nichole PA-C

## 2017-12-19 NOTE — MR AVS SNAPSHOT
After Visit Summary   12/19/2017    Elly Golden    MRN: [id number]           Patient Information     Date Of Birth          1970        Visit Information        Provider Department      12/19/2017 2:40 PM Esther Nichole PA-C [facility]        Today's Diagnoses     Bilateral carpal tunnel syndrome    -  1       Follow-ups after your visit        Additional Services     NEUROLOGY ADULT REFERRAL       Your provider has referred you for the following:   [facility] [place] [facility] [phone]   [email or web address] and G: [place] [facility] [place] [phone]   [email or web address]    Please be aware that coverage of these services is subject to the terms and limitations of your health insurance plan.  Call member services at your health plan with any benefit or coverage questions.      Please bring the following with you to your appointment:    (1) Any X-Rays, CTs or MRIs which have been performed.  Contact the facility where they were done to arrange for  prior to your scheduled appointment.    (2) List of current medications  (3) This referral request   (4) Any documents/labs given to you for this referral                  Your next 10 appointments already scheduled     Dec 20, 2017 12:50 PM CST   KORY Extremity with Boston Mack PT   [facility] [place] [facility] ([facility])    [address] [facility] [id number] [phone]            Jan 04, 2018  1:30 PM CST   [facility] with REINA Holman   [facility]    [address] [facility] [id number] [phone]              Who to contact     If you have questions or need follow up information about today's clinic visit or your schedule please contact [facility] directly at [phone].  Normal or non-critical lab and imaging results will be communicated to you by MyChart, letter or phone within 4 business days after the clinic has received the results. If you do not hear from us within 7 days, please contact the clinic through [employer] or phone. If you have a critical or abnormal lab result, we will notify you by phone as soon as possible.  Submit refill requests through [employer] or call your pharmacy and they will forward the refill request to us. Please allow 3 business days for your refill to be completed.          Additional Information About Your Visit        [employer] Information     [employer] gives you secure access to your electronic health record. If you see a primary care provider, you can also send messages to your care team and make appointments. If you have questions, please call your primary care clinic.  If you do not have a primary care provider, please call [phone] and they will assist you.        Care EveryWhere ID     This is your Care EveryWhere ID. This could be used by other organizations to access your [facility] medical records  [id number]        Your Vitals Were     Pulse Temperature Pulse Oximetry BMI (Body Mass Index)          65 98  F (36.7  C) (Oral) 98% 36.19 kg/m2         Blood Pressure from Last 3 Encounters:   12/19/17 99/65   11/29/17 106/69    Weight from Last 3 Encounters:   12/19/17 212 lb (96.2 kg)   11/29/17 210 lb 3.2 oz (95.3 kg)              We Performed the Following     NEUROLOGY ADULT REFERRAL        Primary Care Provider Office Phone # Fax #    [facility] [phone]       [address] [place] [address]        Equal Access to Services     FELECIA LLOYD : Corine Yan, carmella delgado, charlotte reilly. So [facility] [phone].    ATENCIÓN: Si habla español, tiene a cordova disposición servicios  kendra de asistencia lingüística. Opal vaz [phone].    We comply with applicable federal civil rights laws and [place] laws. We do not discriminate on the basis of race, color, national origin, age, disability, sex, sexual orientation, or gender identity.            Thank you!     Thank you for choosing [facility]  for your care. Our goal is always to provide you with excellent care. Hearing back from our patients is one way we can continue to improve our services. Please take a few minutes to complete the written survey that you may receive in the mail after your visit with us. Thank you!             Your Updated Medication List - Protect others around you: Learn how to safely use, store and throw away your medicines at www.disposemymeds.org.          This list is accurate as of: 12/19/17  3:10 PM.  Always use your most recent med list.                   Brand Name Dispense Instructions for use Diagnosis    levonorgestrel 20 MCG/24HR IUD    MIRENA     1 each by Intrauterine route once        order for DME     2 each    Equipment being ordered: Bilateral wrist splints-no thumb.    Bilateral carpal tunnel syndrome

## 2017-12-19 NOTE — NURSING NOTE
"Chief Complaint   Patient presents with     Patient Request     discuss carpal tunnel     Health Maintenance     lipid       Initial BP 99/65 (BP Location: Left arm, Patient Position: Chair, Cuff Size: Adult Large)  Pulse 65  Temp 98  F (36.7  C) (Oral)  Wt 212 lb (96.2 kg)  SpO2 98%  BMI 36.19 kg/m2 Estimated body mass index is 36.19 kg/(m^2) as calculated from the following:    Height as of 11/29/17: 5' 4.17\" (1.63 m).    Weight as of this encounter: 212 lb (96.2 kg).  Medication Reconciliation: complete   Carola See EDUARDO Graff      "

## 2017-12-20 ENCOUNTER — THERAPY VISIT (OUTPATIENT)
Dept: PHYSICAL THERAPY | Facility: CLINIC | Age: 47
End: 2017-12-20
Payer: COMMERCIAL

## 2017-12-20 DIAGNOSIS — M25.579 PAIN IN JOINT, ANKLE AND FOOT, UNSPECIFIED LATERALITY: ICD-10-CM

## 2017-12-20 PROCEDURE — 97110 THERAPEUTIC EXERCISES: CPT | Mod: GP | Performed by: PHYSICAL THERAPIST

## 2017-12-20 PROCEDURE — 97035 APP MDLTY 1+ULTRASOUND EA 15: CPT | Mod: GP | Performed by: PHYSICAL THERAPIST

## 2017-12-20 PROCEDURE — 97140 MANUAL THERAPY 1/> REGIONS: CPT | Mod: GP | Performed by: PHYSICAL THERAPIST

## 2017-12-20 ASSESSMENT — ANXIETY QUESTIONNAIRES: GAD7 TOTAL SCORE: 17

## 2017-12-20 NOTE — MR AVS SNAPSHOT
After Visit Summary   12/20/2017    Elly Golden    MRN: [id number]           Patient Information     Date Of Birth          1970        Visit Information        Provider Department      12/20/2017 12:50 PM Boston Mack, PT [facility]        Today's Diagnoses     Pain in joint, ankle and foot, unspecified laterality           Follow-ups after your visit        Your next 10 appointments already scheduled     Dec 27, 2017 10:10 AM CST   KORY Extremity with Zaina White, PREM   [facility]    [address] [facility] [address]   [phone]            Jan 02, 2018 11:00 AM CST   [facility] Eamon Kirkpatrick   [facility]    [address] [place] [address]   [phone]            Jan 02, 2018 11:30 AM CST   New Visit with Tien Bower MD   [facility]    [address] [place] [address]   [phone]            Jan 04, 2018  1:30 PM CST   [facility] ([facility])    [address] [facility] [address] [phone]            Jan 05, 2018 12:00 PM CST   KORY Extremity with Boston Mack, PT   [facility]    [address] [facility] [address] [phone]              Who to contact     If you have questions or need follow up information about today's clinic visit or your schedule please contact [facility] directly at [phone].  Normal or non-critical lab and imaging results will be communicated to you by MyChart, letter or phone within 4 business days after the clinic has received the results.  If you do not hear from us within 7 days, please contact the clinic through [employer] or phone. If you have a critical or abnormal lab result, we will notify you by phone as soon as possible.  Submit refill requests through [employer] or call your pharmacy and they will forward the refill request to us. Please allow 3 business days for your refill to be completed.          Additional Information About Your Visit        [employer] Information     [employer] gives you secure access to your electronic health record. If you see a primary care provider, you can also send messages to your care team and make appointments. If you have questions, please call your primary care clinic.  If you do not have a primary care provider, please call [phone] and they will assist you.        Care EveryWhere ID     This is your Care EveryWhere ID. This could be used by other organizations to access your [place] medical records  [id number]         Blood Pressure from Last 3 Encounters:   12/19/17 99/65   11/29/17 106/69    Weight from Last 3 Encounters:   12/19/17 96.2 kg (212 lb)   11/29/17 95.3 kg (210 lb 3.2 oz)              We Performed the Following     MANUAL THER TECH,1+REGIONS,EA 15 MIN     THERAPEUTIC EXERCISES     ULTRASOUND THERAPY        Primary Care Provider Office Phone # Fax #    [facility] [phone]       [address] [place] [id number]        Equal Access to Services     FELECIA LLOYD : Hadii melanie ku hadasho Soomaali, waaxda luqadaha, qaybta kaalmada adereginaldyada, charlotte rivera. So [facility] [phone].    ATENCIÓN: Si habla español, tiene a cordova disposición servicios gratuitos de asistencia lingüística. Llame al [phone].    We comply with applicable federal civil rights laws and [place] laws. We do not discriminate on the basis of race, color, national origin, age, disability, sex, sexual orientation, or gender identity.            Thank you!      Thank you for choosing [facility]  for your care. Our goal is always to provide you with excellent care. Hearing back from our patients is one way we can continue to improve our services. Please take a few minutes to complete the written survey that you may receive in the mail after your visit with us. Thank you!             Your Updated Medication List - Protect others around you: Learn how to safely use, store and throw away your medicines at www.disposemymeds.org.          This list is accurate as of: 12/20/17  1:21 PM.  Always use your most recent med list.                   Brand Name Dispense Instructions for use Diagnosis    levonorgestrel 20 MCG/24HR IUD    MIRENA     1 each by Intrauterine route once        order for DME     2 each    Equipment being ordered: Bilateral wrist splints-no thumb.    Bilateral carpal tunnel syndrome

## 2018-04-23 PROBLEM — M25.579 PAIN IN JOINT, ANKLE AND FOOT, UNSPECIFIED LATERALITY: Status: RESOLVED | Noted: 2017-12-13 | Resolved: 2018-04-23

## 2018-04-23 NOTE — PROGRESS NOTES
Subjective:  HPI                    Objective:  System    Physical Exam    General     ROS    Assessment/Plan:    DISCHARGE REPORT    Progress reporting period is from 12/13/2018 to 12/20/2018.     SUBJECTIVE  Subjective: Does well until she walks.  Can do about 1 block   Current Pain level: 3/10   Initial Pain level: 3/10   Changes in function: No changes noted in function since last SOAP note   Adverse reactions: None;   ,     Patient has failed to return to therapy so current objective findings are unknown.  The subjective and objective information are from the last SOAP note on this patient.    OBJECTIVE  Objective: tender R gastroc      ASSESSMENT/PLAN  Updated problem list and treatment plan: Diagnosis 1:  bilat achilles tendonitis  Pain -  hot/cold therapy, US, manual therapy, self management, education, directional preference exercise and home program  Decreased ROM/flexibility - manual therapy and therapeutic exercise  Inflammation - cold therapy and US  Decreased function - therapeutic activities  STG/LTGs have been met or progress has been made towards goals:  unknown  Assessment of Progress: The patient has not returned to therapy. Current status is unknown.  Self Management Plans:  Patient has been instructed in a home treatment program.    Elly continues to require the following intervention to meet STG and LTG's: PT intervention is no longer required to meet STG/LTG.  The patient failed to complete scheduled/ordered appointments so current information is unknown.  We will discharge this patient from PT.    Recommendations:  Failed and canceled last two visits.  Discharge pt from PT.    Please refer to the daily flowsheet for treatment today, total treatment time and time spent performing 1:1 timed codes.

## 2019-07-11 ENCOUNTER — TRANSFERRED RECORDS (OUTPATIENT)
Dept: HEALTH INFORMATION MANAGEMENT | Facility: CLINIC | Age: 49
End: 2019-07-11

## 2019-07-11 LAB
ALT SERPL-CCNC: 16 U/L (ref 4–35)
AST SERPL-CCNC: 20 U/L (ref 12–35)
CREATININE (EXTERNAL): 0.8 MG/DL (ref 0.6–1.3)
GLUCOSE (EXTERNAL): 84 MG/DL (ref 60–115)
POTASSIUM (EXTERNAL): 3.9 MMOL/L (ref 3.5–4.9)

## 2019-07-12 ENCOUNTER — TRANSFERRED RECORDS (OUTPATIENT)
Dept: HEALTH INFORMATION MANAGEMENT | Facility: CLINIC | Age: 49
End: 2019-07-12

## 2019-08-15 LAB
CREATININE (EXTERNAL): 0.8 MG/DL (ref 0.6–1.3)
GLUCOSE (EXTERNAL): 100 MG/DL (ref 60–115)
POTASSIUM (EXTERNAL): 3.6 MMOL/L (ref 3.5–4.9)

## 2019-08-26 ENCOUNTER — TRANSFERRED RECORDS (OUTPATIENT)
Dept: HEALTH INFORMATION MANAGEMENT | Facility: CLINIC | Age: 49
End: 2019-08-26

## 2019-09-17 ENCOUNTER — TRANSFERRED RECORDS (OUTPATIENT)
Dept: HEALTH INFORMATION MANAGEMENT | Facility: CLINIC | Age: 49
End: 2019-09-17

## 2020-03-11 ENCOUNTER — HEALTH MAINTENANCE LETTER (OUTPATIENT)
Age: 50
End: 2020-03-11

## 2020-12-27 ENCOUNTER — HEALTH MAINTENANCE LETTER (OUTPATIENT)
Age: 50
End: 2020-12-27

## 2021-03-06 ENCOUNTER — HEALTH MAINTENANCE LETTER (OUTPATIENT)
Age: 51
End: 2021-03-06

## 2021-04-01 ENCOUNTER — OFFICE VISIT (OUTPATIENT)
Dept: FAMILY MEDICINE | Facility: CLINIC | Age: 51
End: 2021-04-01
Payer: COMMERCIAL

## 2021-04-01 VITALS
DIASTOLIC BLOOD PRESSURE: 82 MMHG | TEMPERATURE: 97.3 F | BODY MASS INDEX: 36.96 KG/M2 | WEIGHT: 216.5 LBS | SYSTOLIC BLOOD PRESSURE: 121 MMHG | HEART RATE: 64 BPM | OXYGEN SATURATION: 99 %

## 2021-04-01 DIAGNOSIS — Z30.432 ENCOUNTER FOR IUD REMOVAL: Primary | ICD-10-CM

## 2021-04-01 DIAGNOSIS — Z00.00 ROUTINE GENERAL MEDICAL EXAMINATION AT A HEALTH CARE FACILITY: ICD-10-CM

## 2021-04-01 LAB
ANION GAP SERPL CALCULATED.3IONS-SCNC: 4 MMOL/L (ref 3–14)
BUN SERPL-MCNC: 17 MG/DL (ref 7–30)
CALCIUM SERPL-MCNC: 9.2 MG/DL (ref 8.5–10.1)
CHLORIDE SERPL-SCNC: 107 MMOL/L (ref 94–109)
CHOLEST SERPL-MCNC: 131 MG/DL
CO2 SERPL-SCNC: 26 MMOL/L (ref 20–32)
CREAT SERPL-MCNC: 0.73 MG/DL (ref 0.52–1.04)
GFR SERPL CREATININE-BSD FRML MDRD: >90 ML/MIN/{1.73_M2}
GLUCOSE SERPL-MCNC: 87 MG/DL (ref 70–99)
HCV AB SERPL QL IA: NONREACTIVE
HDLC SERPL-MCNC: 57 MG/DL
HIV 1+2 AB+HIV1 P24 AG SERPL QL IA: NONREACTIVE
LDLC SERPL CALC-MCNC: 53 MG/DL
NONHDLC SERPL-MCNC: 74 MG/DL
POTASSIUM SERPL-SCNC: 4.3 MMOL/L (ref 3.4–5.3)
SODIUM SERPL-SCNC: 137 MMOL/L (ref 133–144)
TRIGL SERPL-MCNC: 107 MG/DL

## 2021-04-01 PROCEDURE — 80048 BASIC METABOLIC PNL TOTAL CA: CPT | Performed by: INTERNAL MEDICINE

## 2021-04-01 PROCEDURE — 87389 HIV-1 AG W/HIV-1&-2 AB AG IA: CPT | Performed by: INTERNAL MEDICINE

## 2021-04-01 PROCEDURE — 99386 PREV VISIT NEW AGE 40-64: CPT | Performed by: INTERNAL MEDICINE

## 2021-04-01 PROCEDURE — 86803 HEPATITIS C AB TEST: CPT | Performed by: INTERNAL MEDICINE

## 2021-04-01 PROCEDURE — 80061 LIPID PANEL: CPT | Performed by: INTERNAL MEDICINE

## 2021-04-01 PROCEDURE — 36415 COLL VENOUS BLD VENIPUNCTURE: CPT | Performed by: INTERNAL MEDICINE

## 2021-04-01 ASSESSMENT — ENCOUNTER SYMPTOMS
EYE PAIN: 0
NAUSEA: 0
JOINT SWELLING: 0
BREAST MASS: 0
HEMATURIA: 0
FEVER: 0
PARESTHESIAS: 0
MYALGIAS: 1
SORE THROAT: 0
CHILLS: 0
HEARTBURN: 1
PALPITATIONS: 0
ARTHRALGIAS: 1
FREQUENCY: 0
SHORTNESS OF BREATH: 0
CONSTIPATION: 0
NERVOUS/ANXIOUS: 1
WEAKNESS: 0
COUGH: 0
DIZZINESS: 0
HEADACHES: 0
DIARRHEA: 0
ABDOMINAL PAIN: 0
DYSURIA: 0
HEMATOCHEZIA: 0

## 2021-04-01 NOTE — PROGRESS NOTES
SUBJECTIVE:   CC: Elly Golden is an 50 year old woman who presents for preventive health visit.     Patient has been advised of split billing requirements and indicates understanding: Yes  Healthy Habits:     Getting at least 3 servings of Calcium per day:  NO    Bi-annual eye exam:  NO    Dental care twice a year:  NO    Sleep apnea or symptoms of sleep apnea:  None    Diet:  Regular (no restrictions)    Frequency of exercise:  None    Taking medications regularly:  No    Barriers to taking medications:  Other    Medication side effects:  Other    PHQ-2 Total Score: 1    Additional concerns today:  Yes    Initially had abnormal mammogram fibrocystic changes  mirena ( 2015 ) January 2017         Today's PHQ-2 Score:   PHQ-2 ( 1999 Pfizer) 4/1/2021   Q1: Little interest or pleasure in doing things 0   Q2: Feeling down, depressed or hopeless 1   PHQ-2 Score 1   Q1: Little interest or pleasure in doing things Not at all   Q2: Feeling down, depressed or hopeless Several days   PHQ-2 Score 1       Abuse: Current or Past (Physical, Sexual or Emotional) - No  Do you feel safe in your environment? Yes    Have you ever done Advance Care Planning? (For example, a Health Directive, POLST, or a discussion with a medical provider or your loved ones about your wishes): No, advance care planning information given to patient to review.  Patient plans to discuss their wishes with loved ones or provider.      Social History     Tobacco Use     Smoking status: Never Smoker     Smokeless tobacco: Never Used   Substance Use Topics     Alcohol use: No     If you drink alcohol do you typically have >3 drinks per day or >7 drinks per week? No    Alcohol Use 4/1/2021   Prescreen: >3 drinks/day or >7 drinks/week? No   No flowsheet data found.  [facility] year  [facility]  Living in [facility] in the basement  Covid Wednesday night   aggrevated    Reviewed orders with patient.  Reviewed health  maintenance and updated orders accordingly - Yes  Lab work is in process  Labs reviewed in EPIC  BP Readings from Last 3 Encounters:   04/01/21 121/82   12/19/17 99/65   11/29/17 106/69    Wt Readings from Last 3 Encounters:   04/01/21 98.2 kg (216 lb 8 oz)   12/19/17 96.2 kg (212 lb)   11/29/17 95.3 kg (210 lb 3.2 oz)                  Patient Active Problem List   Diagnosis     Anxiety     PTSD (post-traumatic stress disorder)     Mild major depression (H)     Past Surgical History:   Procedure Laterality Date     CHOLECYSTECTOMY  8/2019     COLONOSCOPY  8/2019     GYN SURGERY      C section x2       Social History     Tobacco Use     Smoking status: Never Smoker     Smokeless tobacco: Never Used   Substance Use Topics     Alcohol use: No     Family History   Problem Relation Age of Onset     Unknown/Adopted Mother      Thyroid Disease Mother         lumps and low thyroide     Unknown/Adopted Father      Coronary Artery Disease Maternal Grandfather      Hypertension Maternal Grandfather      Hyperlipidemia Maternal Grandfather      Depression Maternal Grandfather      Anxiety Disorder Maternal Grandfather      Mental Illness Maternal Grandfather         mental break downs     Hyperlipidemia Maternal Grandmother      Other Cancer Maternal Grandmother         had to have a full hystorectomy a few years after her second child was born     Mental Illness Maternal Grandmother         bi polar, schizophrenia, anxiety     Thyroid Disease Maternal Grandmother         low thyroid     Obesity Maternal Grandmother      Thyroid Disease Other         thyroid removed at age 29 because of [facility]         Current Outpatient Medications   Medication Sig Dispense Refill     levonorgestrel (MIRENA) 20 MCG/24HR IUD 1 each by Intrauterine route once       Allergies   Allergen Reactions     Augmentin Hives     Able to tolerate PCN- allergic to the clavulante     Wasps [Hornets]      Erythromycin Nausea and Vomiting     Recent Labs    Lab Test 07/26/17   TSH 4.32        Breast Cancer Screening:  Any new diagnosis of family breast, ovarian, or bowel cancer? UNKNOWN     FSH-7: No flowsheet data found.    Mammogram Screening: Recommended annual mammography  Pertinent mammograms are reviewed under the imaging tab.    History of abnormal Pap smear: NO - age 30-65 PAP every 5 years with negative HPV co-testing recommended     Reviewed and updated as needed this visit by clinical staff  Tobacco  Allergies  Meds   Med Hx  Surg Hx  Fam Hx  Soc Hx        Reviewed and updated as needed this visit by Provider                    Review of Systems   Constitutional: Negative for chills and fever.   HENT: Positive for hearing loss. Negative for congestion, ear pain and sore throat.    Eyes: Negative for pain and visual disturbance.   Respiratory: Negative for cough and shortness of breath.    Cardiovascular: Negative for chest pain, palpitations and peripheral edema.   Gastrointestinal: Positive for heartburn. Negative for abdominal pain, constipation, diarrhea, hematochezia and nausea.   Breasts:  Negative for tenderness, breast mass and discharge.   Genitourinary: Negative for dysuria, frequency, genital sores, hematuria, pelvic pain, urgency, vaginal bleeding and vaginal discharge.   Musculoskeletal: Positive for arthralgias and myalgias. Negative for joint swelling.   Skin: Negative for rash.   Neurological: Negative for dizziness, weakness, headaches and paresthesias.   Psychiatric/Behavioral: Positive for mood changes. The patient is nervous/anxious.      CONSTITUTIONAL: NEGATIVE for fever, chills, change in weight  INTEGUMENTARU/SKIN: NEGATIVE for worrisome rashes, moles or lesions  EYES: NEGATIVE for vision changes or irritation  ENT: NEGATIVE for ear, mouth and throat problems  RESP: NEGATIVE for significant cough or SOB  BREAST: NEGATIVE for masses, tenderness or discharge  CV: NEGATIVE for chest pain, palpitations or peripheral edema  GI:  NEGATIVE for nausea, abdominal pain, heartburn, or change in bowel habits  : NEGATIVE for unusual urinary or vaginal symptoms. Periods are regular.  MUSCULOSKELETAL: NEGATIVE for significant arthralgias or myalgia  NEURO: NEGATIVE for weakness, dizziness or paresthesias  PSYCHIATRIC: NEGATIVE for changes in mood or affect     OBJECTIVE:   There were no vitals taken for this visit.  Physical Exam  GENERAL: healthy, alert and no distress  EYES: Eyes grossly normal to inspection, PERRL and conjunctivae and sclerae normal  HENT: ear canals and TM's normal, nose and mouth without ulcers or lesions  NECK: no adenopathy, no asymmetry, masses, or scars and thyroid normal to palpation  RESP: lungs clear to auscultation - no rales, rhonchi or wheezes  CV: regular rate and rhythm, normal S1 S2, no S3 or S4, no murmur, click or rub, no peripheral edema and peripheral pulses strong  ABDOMEN: soft, nontender, no hepatosplenomegaly, no masses and bowel sounds normal  MS: no gross musculoskeletal defects noted, no edema  SKIN: no suspicious lesions or rashes  NEURO: Normal strength and tone, mentation intact and speech normal  BACK: no CVA tenderness, no paralumbar tenderness  PSYCH: mentation appears normal, affect normal/bright  LYMPH: no cervical, supraclavicular, axillary, or inguinal adenopathy    Diagnostic Test Results:  Labs reviewed in Epic  No results found for any visits on 04/01/21.    ASSESSMENT/PLAN:   Elly was seen today for physical.    Diagnoses and all orders for this visit:    Encounter for IUD removal  -     OB/GYN REFERRAL    Routine general medical examination at a health care facility  -     *MA Screening Digital Bilateral; Future  -     HIV Antigen Antibody Combo  -     Hepatitis C antibody  -     Lipid panel reflex to direct LDL Fasting  -     Basic metabolic panel  (Ca, Cl, CO2, Creat, Gluc, K, Na, BUN)      Will request the PAP be performed when IUD procedure completed.  Patient would like one pelvic  "exam at that time   Tetanus booster after covid vaccine completed    Patient has been advised of split billing requirements and indicates understanding: Yes  COUNSELING:  Reviewed preventive health counseling, as reflected in patient instructions       Regular exercise       Healthy diet/nutrition       Vision screening       Hearing screening    Estimated body mass index is 36.19 kg/m  as calculated from the following:    Height as of 11/29/17: 1.63 m (5' 4.17\").    Weight as of 12/19/17: 96.2 kg (212 lb).    Weight management plan: Discussed healthy diet and exercise guidelines    She reports that she has never smoked. She has never used smokeless tobacco.      Counseling Resources:  ATP IV Guidelines  Pooled Cohorts Equation Calculator  Breast Cancer Risk Calculator  BRCA-Related Cancer Risk Assessment: FHS-7 Tool  FRAX Risk Assessment  ICSI Preventive Guidelines  Dietary Guidelines for Americans, 2010  USDA's MyPlate  ASA Prophylaxis  Lung CA Screening    David Tracy MD  [facility]  "

## 2021-04-08 ENCOUNTER — ANCILLARY PROCEDURE (OUTPATIENT)
Dept: MAMMOGRAPHY | Facility: CLINIC | Age: 51
End: 2021-04-08
Attending: INTERNAL MEDICINE
Payer: COMMERCIAL

## 2021-04-08 DIAGNOSIS — Z12.31 VISIT FOR SCREENING MAMMOGRAM: ICD-10-CM

## 2021-04-08 PROCEDURE — 77067 SCR MAMMO BI INCL CAD: CPT | Mod: TC | Performed by: RADIOLOGY

## 2021-04-22 ENCOUNTER — OFFICE VISIT (OUTPATIENT)
Dept: OBGYN | Facility: CLINIC | Age: 51
End: 2021-04-22
Payer: COMMERCIAL

## 2021-04-22 VITALS
WEIGHT: 218.1 LBS | OXYGEN SATURATION: 99 % | HEART RATE: 69 BPM | DIASTOLIC BLOOD PRESSURE: 68 MMHG | SYSTOLIC BLOOD PRESSURE: 113 MMHG | BODY MASS INDEX: 37.24 KG/M2

## 2021-04-22 DIAGNOSIS — Z12.4 PAP SMEAR FOR CERVICAL CANCER SCREENING: ICD-10-CM

## 2021-04-22 DIAGNOSIS — Z01.419 ENCOUNTER FOR GYNECOLOGICAL EXAMINATION WITHOUT ABNORMAL FINDING: Primary | ICD-10-CM

## 2021-04-22 DIAGNOSIS — Z30.433 ENCOUNTER FOR IUD REMOVAL AND REINSERTION: ICD-10-CM

## 2021-04-22 PROCEDURE — G0145 SCR C/V CYTO,THINLAYER,RESCR: HCPCS | Performed by: OBSTETRICS & GYNECOLOGY

## 2021-04-22 PROCEDURE — 58301 REMOVE INTRAUTERINE DEVICE: CPT | Performed by: OBSTETRICS & GYNECOLOGY

## 2021-04-22 PROCEDURE — 87624 HPV HI-RISK TYP POOLED RSLT: CPT | Performed by: OBSTETRICS & GYNECOLOGY

## 2021-04-22 PROCEDURE — 99386 PREV VISIT NEW AGE 40-64: CPT | Mod: 25 | Performed by: OBSTETRICS & GYNECOLOGY

## 2021-04-22 PROCEDURE — 58300 INSERT INTRAUTERINE DEVICE: CPT | Performed by: OBSTETRICS & GYNECOLOGY

## 2021-04-25 NOTE — PROGRESS NOTES
Elly Golden is a 50 year old female , who presents for gyn exam and IUD removal and reinsertion.   No unusual bleeding, no incontinence, or unusual discharge.   She is currently using the Mirena IUD for for menstrual regulation  She does not have any apparent contraindications to use.  She has not had any apparent complications with it's use.  Last cholesterol:   Recent Labs   Lab Test 21  0943   CHOL 131   HDL 57   LDL 53   TRIG 107     Past Medical History:   Diagnosis Date     ADHD      Anxiety      Depression      Depressive disorder      PTSD (post-traumatic stress disorder)        Past Surgical History:   Procedure Laterality Date     CHOLECYSTECTOMY  2019     COLONOSCOPY  2019     GYN SURGERY      C section x2     TUBAL LIGATION Bilateral        OB History    Para Term  AB Living   2 2 0 0 0 2   SAB TAB Ectopic Multiple Live Births   0 0 0 0 2      # Outcome Date GA Lbr Rony/2nd Weight Sex Delivery Anes PTL Lv   2 Para            1 Para                Gyn History:  Gynecological History         No LMP recorded. (Menstrual status: IUD).     No STD/No PID/she has the Mirena IUD      history of abnormal pap smear:  No  Last pap: No results found for: PAP        Current Outpatient Medications   Medication Sig Dispense Refill     levonorgestrel (MIRENA) 20 MCG/24HR IUD 1 each by Intrauterine route once         Allergies   Allergen Reactions     Latex Other (See Comments)     Chemical burn     Augmentin Hives     Able to tolerate PCN- allergic to the clavulante     Wasps [Hornets]      Erythromycin Nausea and Vomiting       Social History     Socioeconomic History     Marital status:      Spouse name: Not on file     Number of children: Not on file     Years of education: Not on file     Highest education level: Not on file   Occupational History     Not on file   Social Needs     Financial resource strain: Not on file     Food insecurity     Worry: Not on file      Inability: Not on file     Transportation needs     Medical: Not on file     Non-medical: Not on file   Tobacco Use     Smoking status: Never Smoker     Smokeless tobacco: Never Used   Substance and Sexual Activity     Alcohol use: No     Drug use: No     Sexual activity: Yes     Partners: Male     Comment: Mirina implant January 2015/ tubes tied in 1999   Lifestyle     Physical activity     Days per week: Not on file     Minutes per session: Not on file     Stress: Not on file   Relationships     Social connections     Talks on phone: Not on file     Gets together: Not on file     Attends Islam service: Not on file     Active member of club or organization: Not on file     Attends meetings of clubs or organizations: Not on file     Relationship status: Not on file     Intimate partner violence     Fear of current or ex partner: Not on file     Emotionally abused: Not on file     Physically abused: Not on file     Forced sexual activity: Not on file   Other Topics Concern     Parent/sibling w/ CABG, MI or angioplasty before 65F 55M? No   Social History Narrative     Not on file       Family History   Problem Relation Age of Onset     Unknown/Adopted Mother      Thyroid Disease Mother         lumps and low thyroide     Unknown/Adopted Father      Coronary Artery Disease Maternal Grandfather      Hypertension Maternal Grandfather      Hyperlipidemia Maternal Grandfather      Depression Maternal Grandfather      Anxiety Disorder Maternal Grandfather      Mental Illness Maternal Grandfather         mental break downs     Hyperlipidemia Maternal Grandmother      Other Cancer Maternal Grandmother         had to have a full hystorectomy a few years after her second child was born     Mental Illness Maternal Grandmother         bi polar, schizophrenia, anxiety     Thyroid Disease Maternal Grandmother         low thyroid     Obesity Maternal Grandmother      Thyroid Disease Other         thyroid removed at age 29 because  of [facility]         ROS:  All negative except as above.      EXAM:  /68 (BP Location: Left arm, Cuff Size: Adult Large)   Pulse 69   Wt 98.9 kg (218 lb 1.6 oz)   SpO2 99%   Breastfeeding No   BMI 37.24 kg/m    General:  WNWD female, NAD  Alert  Oriented x 3  Gait:  Normal  Skin:  Normal skin turgor  Neurologic:  CN grossly intact, good sensation.    HEENT:  NC/AT, EOMI  Neck:  No masses noted, symmetrical.   Lungs:  Good respiratory effort   Abdomen:  Non-tender, non-distended.  Vulva: No external lesions, normal hair distribution, no adenopathy  BUS:  Normal, no masses noted  Urethra:  No hypermobility noted  Urethral meatus:  No masses noted  Vagina: Moist, pink, no abnormal discharge, well rugated, no lesions  Cervix: Smooth, pink, no visible lesions  Uterus: Normal size, anteverted, non-tender, mobile  Ovaries: No mass, non-tender, mobile  Perianal:  No masses noted.    Rectal exam: Not performed   Extremities:  No clubbing, cyanosis, or edema      ASSESSMENT/PLAN   GYN examination with pap smear  Encounter for IUD removal and reinsertion.   Low fat diet, weight bearing exercises and self breast exams on a monthly basis have been recommended.  I have discussed with patient the risks, benefits, medications, treatment options and modalities.   I have instructed the patient to call or schedule a follow-up appointment if any problems.    Jcarlos Pichardo MD        PROCEDURE NOTE:  I discussed the method, effectiveness, contraindications, risks, benefits, alternatives and the insertion and removal procedures.  Patient was an appropriate candidate and desired to proceed.  Consent signed.  Pregnancy test today is not performed as she has a current Mirena IUD.   The patient desires to have the IUD removed.  She reports that she has used it for the past 5 years.  I reviewed with her about the other benefits for the procedure and the goals and limitations and she voiced her understanding.  The procedure was  discussed and she voiced her understanding.  The patient was examined, she was prepped for the IUD removal.  The ring forceps were used to remove the IUD.  The Mirena IUD was shown to the patient to verify the IUD.    The cervix was grasped with a tenaculum and the uterine cavity sounded to 7.5 cm.  The Mirena IUD was inserted using the standard and sterile technique.  The strings were trimmed to approximately 2.5 cm.  No apparent complications.  Patient tolerated the procedure well.  The IUD effectiveness is 5 years.    Follow up in 1 month for IUD check and yearly for annual exams.  She should alert us to any GYN problems.     LOT NUMBER: [id number]  EXP DATE:  July 2023  NDC 32530-824-59

## 2021-04-27 LAB
COPATH REPORT: NORMAL
PAP: NORMAL

## 2021-04-28 LAB
FINAL DIAGNOSIS: NORMAL
HPV HR 12 DNA CVX QL NAA+PROBE: NEGATIVE
HPV16 DNA SPEC QL NAA+PROBE: NEGATIVE
HPV18 DNA SPEC QL NAA+PROBE: NEGATIVE
SPECIMEN DESCRIPTION: NORMAL
SPECIMEN SOURCE CVX/VAG CYTO: NORMAL

## 2021-05-05 ENCOUNTER — OFFICE VISIT (OUTPATIENT)
Dept: OPTOMETRY | Facility: CLINIC | Age: 51
End: 2021-05-05
Payer: COMMERCIAL

## 2021-05-05 DIAGNOSIS — H52.13 MYOPIA OF BOTH EYES: ICD-10-CM

## 2021-05-05 DIAGNOSIS — H52.4 PRESBYOPIA: ICD-10-CM

## 2021-05-05 DIAGNOSIS — H52.223 REGULAR ASTIGMATISM OF BOTH EYES: ICD-10-CM

## 2021-05-05 DIAGNOSIS — H01.00B BLEPHARITIS OF UPPER AND LOWER EYELIDS OF BOTH EYES, UNSPECIFIED TYPE: ICD-10-CM

## 2021-05-05 DIAGNOSIS — H01.00A BLEPHARITIS OF UPPER AND LOWER EYELIDS OF BOTH EYES, UNSPECIFIED TYPE: ICD-10-CM

## 2021-05-05 DIAGNOSIS — Z01.01 ENCOUNTER FOR EXAMINATION OF EYES AND VISION WITH ABNORMAL FINDINGS: Primary | ICD-10-CM

## 2021-05-05 PROCEDURE — 92015 DETERMINE REFRACTIVE STATE: CPT | Performed by: OPTOMETRIST

## 2021-05-05 PROCEDURE — 92004 COMPRE OPH EXAM NEW PT 1/>: CPT | Performed by: OPTOMETRIST

## 2021-05-05 ASSESSMENT — CONF VISUAL FIELD
METHOD: COUNTING FINGERS
OD_NORMAL: 1
OS_NORMAL: 1

## 2021-05-05 ASSESSMENT — VISUAL ACUITY
OD_SC+: +1
OD_SC: 20/80
OS_SC: 20/70
OS_CC: 20/40-1
METHOD: SNELLEN - LINEAR
OD_CC: 20/60-1
OD_CC: 20/20
OS_SC+: -1
CORRECTION_TYPE: GLASSES
OS_CC: 20/20

## 2021-05-05 ASSESSMENT — REFRACTION_WEARINGRX
OS_SPHERE: -3.75
OS_AXIS: 080
SPECS_TYPE: SVL
OD_CYLINDER: +2.00
OD_SPHERE: -3.50
OS_CYLINDER: +2.50
OD_AXIS: 095

## 2021-05-05 ASSESSMENT — REFRACTION_MANIFEST
OD_CYLINDER: +1.50
OS_CYLINDER: +2.25
OS_AXIS: 080
OD_ADD: +1.50
OS_SPHERE: -4.00
OS_ADD: +1.50
OD_SPHERE: -3.50
OD_AXIS: 109

## 2021-05-05 ASSESSMENT — CUP TO DISC RATIO
OD_RATIO: 0.35
OS_RATIO: 0.3

## 2021-05-05 ASSESSMENT — EXTERNAL EXAM - RIGHT EYE: OD_EXAM: NORMAL

## 2021-05-05 ASSESSMENT — TONOMETRY
IOP_METHOD: APPLANATION
OS_IOP_MMHG: 15
OD_IOP_MMHG: 14

## 2021-05-05 ASSESSMENT — SLIT LAMP EXAM - LIDS: COMMENTS: 2+ BLEPHARITIS

## 2021-05-05 ASSESSMENT — EXTERNAL EXAM - LEFT EYE: OS_EXAM: NORMAL

## 2021-05-05 NOTE — LETTER
5/5/2021         RE: Elly Golden  [address]  [place] [address]        Dear Colleague,    Thank you for referring your patient, Elly Golden, to the [facility]. Please see a copy of my visit note below.    Chief Complaint   Patient presents with     Annual Eye Exam      Blur at near  Wants new glasses.  Gets a 'zit or something' on the upper lid left eye. Currently not present.   POHX of floaters  No other concerns at this time.     Last Eye Exam: 2015  Dilated Previously: Yes. Signs and symptoms of dilation were discussed. Patient consents to dilation today.    What are you currently using to see?  glasses       Distance Vision Acuity: Satisfied with vision    Near Vision Acuity: Not satisfied     Eye Comfort: good  Do you use eye drops? : No    Anuja Hanna CPO        Medical, surgical and family histories reviewed and updated 5/5/2021.       OBJECTIVE: See Ophthalmology exam    ASSESSMENT:    ICD-10-CM    1. Encounter for examination of eyes and vision with abnormal findings  Z01.01    2. Blepharitis of upper and lower eyelids of both eyes, unspecified type  H01.00A     H01.00B    3. Myopia of both eyes  H52.13    4. Regular astigmatism of both eyes  H52.223    5. Presbyopia  H52.4       PLAN:     Patient Instructions     Blepharitis is a chronic or long term inflammation of the eyelids and eyelashes. It affects all ages and may appear as greasy flakes on the base of the eyelashes, crusting of eyelashes and mild redness of the eyelid margins.  Sometimes it may result in an acute infection of a gland in the eyelid called a stye and sometimes painless firm nodules can form in the eyelid.  Overabundance of bacterial microorganisms along the eyelashes and lid margins induce stress on the tear film and promote inflammation.    Treatment includes warm compresses and improved lid hygiene. Regular lid hygiene helps diminish the bacterial population to prevent inflammation and  infection.    Eyelid cleansers maintain clean and healthy eyelid margins.  Ocusoft or Sterilid are commercial products that are available as individual wrapped cleansing pads and can be purchased at most pharmacies. Cleanse lids once daily with a lid cleansing product as directed. Diluted baby shampoo can also be safely applied to the eyelids and is another method to improve lid hygiene.   Directions for warm soaks  There are few methods for hot compresses. Moisten a washcloth with hot water, or microwave for 10 seconds, being careful to not get the cloth too hot.   Then put the washcloth onto your eyelids for 5 minutes. It will cool quickly so a rice pack or eyemask that can be heated and laid on top of the washcloth will help retain the heat.    Elly was advised of today's exam findings.  Fill glasses prescription  Allow 2 weeks to adapt to change in glasses  Wear glasses full time  Copy of glasses Rx provided today. Discussed bifocal options.    Return in 1 year for eye exam, or sooner if needed.    The effects of the dilating drops last for 4- 6 hours.  You will be more sensitive to light and vision will be blurry up close.  Mydriatic sunglasses were given if needed.    Carter Conteh O.D.  [address]. [place]  [address] [phone]           Again, thank you for allowing me to participate in the care of your patient.        Sincerely,        Carter Conteh OD

## 2021-05-05 NOTE — PROGRESS NOTES
Chief Complaint   Patient presents with     Annual Eye Exam      Blur at near  Wants new glasses.  Gets a 'zit or something' on the upper lid left eye. Currently not present.   POHX of floaters  No other concerns at this time.     Last Eye Exam: 2015  Dilated Previously: Yes. Signs and symptoms of dilation were discussed. Patient consents to dilation today.    What are you currently using to see?  glasses       Distance Vision Acuity: Satisfied with vision    Near Vision Acuity: Not satisfied     Eye Comfort: good  Do you use eye drops? : No    Anuja Hanna CPO        Medical, surgical and family histories reviewed and updated 5/5/2021.       OBJECTIVE: See Ophthalmology exam    ASSESSMENT:    ICD-10-CM    1. Encounter for examination of eyes and vision with abnormal findings  Z01.01    2. Blepharitis of upper and lower eyelids of both eyes, unspecified type  H01.00A     H01.00B    3. Myopia of both eyes  H52.13    4. Regular astigmatism of both eyes  H52.223    5. Presbyopia  H52.4       PLAN:     Patient Instructions     Blepharitis is a chronic or long term inflammation of the eyelids and eyelashes. It affects all ages and may appear as greasy flakes on the base of the eyelashes, crusting of eyelashes and mild redness of the eyelid margins.  Sometimes it may result in an acute infection of a gland in the eyelid called a stye and sometimes painless firm nodules can form in the eyelid.  Overabundance of bacterial microorganisms along the eyelashes and lid margins induce stress on the tear film and promote inflammation.    Treatment includes warm compresses and improved lid hygiene. Regular lid hygiene helps diminish the bacterial population to prevent inflammation and infection.    Eyelid cleansers maintain clean and healthy eyelid margins.  Ocusoft or Sterilid are commercial products that are available as individual wrapped cleansing pads and can be purchased at most pharmacies. Cleanse lids once daily with a lid  cleansing product as directed. Diluted baby shampoo can also be safely applied to the eyelids and is another method to improve lid hygiene.   Directions for warm soaks  There are few methods for hot compresses. Moisten a washcloth with hot water, or microwave for 10 seconds, being careful to not get the cloth too hot.   Then put the washcloth onto your eyelids for 5 minutes. It will cool quickly so a rice pack or eyemask that can be heated and laid on top of the washcloth will help retain the heat.    Elly was advised of today's exam findings.  Fill glasses prescription  Allow 2 weeks to adapt to change in glasses  Wear glasses full time  Copy of glasses Rx provided today. Discussed bifocal options.    Return in 1 year for eye exam, or sooner if needed.    The effects of the dilating drops last for 4- 6 hours.  You will be more sensitive to light and vision will be blurry up close.  Mydriatic sunglasses were given if needed.    Carter Conteh O.D.  [facility]  [address] [place] [address]. [place]  [address]    [phone]

## 2021-05-05 NOTE — PATIENT INSTRUCTIONS
Blepharitis is a chronic or long term inflammation of the eyelids and eyelashes. It affects all ages and may appear as greasy flakes on the base of the eyelashes, crusting of eyelashes and mild redness of the eyelid margins.  Sometimes it may result in an acute infection of a gland in the eyelid called a stye and sometimes painless firm nodules can form in the eyelid.  Overabundance of bacterial microorganisms along the eyelashes and lid margins induce stress on the tear film and promote inflammation.    Treatment includes warm compresses and improved lid hygiene. Regular lid hygiene helps diminish the bacterial population to prevent inflammation and infection.    Eyelid cleansers maintain clean and healthy eyelid margins.  Ocusoft or Sterilid are commercial products that are available as individual wrapped cleansing pads and can be purchased at most pharmacies. Cleanse lids once daily with a lid cleansing product as directed. Diluted baby shampoo can also be safely applied to the eyelids and is another method to improve lid hygiene.   Directions for warm soaks  There are few methods for hot compresses. Moisten a washcloth with hot water, or microwave for 10 seconds, being careful to not get the cloth too hot.   Then put the washcloth onto your eyelids for 5 minutes. It will cool quickly so a rice pack or eyemask that can be heated and laid on top of the washcloth will help retain the heat.    Elly was advised of today's exam findings.  Fill glasses prescription  Allow 2 weeks to adapt to change in glasses  Wear glasses full time  Copy of glasses Rx provided today. Discussed bifocal options.    Return in 1 year for eye exam, or sooner if needed.    The effects of the dilating drops last for 4- 6 hours.  You will be more sensitive to light and vision will be blurry up close.  Mydriatic sunglasses were given if needed.    Carter Conteh O.D.  [facility] [place]. [place]   [address]    [phone]

## 2021-05-18 ENCOUNTER — TELEPHONE (OUTPATIENT)
Dept: OTOLARYNGOLOGY | Facility: CLINIC | Age: 51
End: 2021-05-18

## 2021-05-18 ENCOUNTER — APPOINTMENT (OUTPATIENT)
Dept: OPTOMETRY | Facility: CLINIC | Age: 51
End: 2021-05-18
Payer: COMMERCIAL

## 2021-05-18 PROCEDURE — 92341 FIT SPECTACLES BIFOCAL: CPT | Performed by: OPTOMETRIST

## 2021-05-18 NOTE — TELEPHONE ENCOUNTER
Called patient to reschedule ENT appointment with Dr. Bower.  She will need an Audiology appoint prior to her ENT appointment.  Advised to call scheduling and schedule the appointment with Audiology first then  ENT.     Iwona Shah MA, [facility] ......5/18/2021...4:22 PM
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
## 2021-05-21 ENCOUNTER — OFFICE VISIT (OUTPATIENT)
Dept: OTOLARYNGOLOGY | Facility: CLINIC | Age: 51
End: 2021-05-21
Payer: COMMERCIAL

## 2021-05-21 ENCOUNTER — OFFICE VISIT (OUTPATIENT)
Dept: AUDIOLOGY | Facility: CLINIC | Age: 51
End: 2021-05-21
Payer: COMMERCIAL

## 2021-05-21 VITALS
OXYGEN SATURATION: 97 % | BODY MASS INDEX: 37.05 KG/M2 | RESPIRATION RATE: 16 BRPM | WEIGHT: 217 LBS | DIASTOLIC BLOOD PRESSURE: 83 MMHG | HEART RATE: 69 BPM | SYSTOLIC BLOOD PRESSURE: 119 MMHG

## 2021-05-21 DIAGNOSIS — H90.2 CONDUCTIVE HEARING LOSS, UNSPECIFIED LATERALITY: Primary | ICD-10-CM

## 2021-05-21 DIAGNOSIS — H93.293 ABNORMAL AUDITORY PERCEPTION OF BOTH EARS: Primary | ICD-10-CM

## 2021-05-21 PROCEDURE — 99207 PR NO CHARGE LOS: CPT | Performed by: AUDIOLOGIST

## 2021-05-21 PROCEDURE — 92550 TYMPANOMETRY & REFLEX THRESH: CPT | Performed by: AUDIOLOGIST

## 2021-05-21 PROCEDURE — 92557 COMPREHENSIVE HEARING TEST: CPT | Performed by: AUDIOLOGIST

## 2021-05-21 PROCEDURE — 99203 OFFICE O/P NEW LOW 30 MIN: CPT | Performed by: OTOLARYNGOLOGY

## 2021-05-21 ASSESSMENT — PAIN SCALES - GENERAL: PAINLEVEL: NO PAIN (0)

## 2021-05-21 NOTE — PATIENT INSTRUCTIONS
Scheduling Information  To schedule your CT/MRI scan, please contact [facility] at [phone] OR [facility] at [phone]    To schedule your Surgery, please contact our Specialty Schedulers at [phone]      ENT Clinic Locations Clinic Hours Telephone Number     [facility]  [address] [place] [address]   Monday:           1:00pm -- 5:00pm    Friday:              8:00am - 12:00pm   To schedule/reschedule an appointment with   Dr. Stearns,   please contact our   Specialty Scheduling Department at:     [phone]       [facility]  [address] [place] [place] [address] Tuesday:          8:00am -- 2:00pm         Urgent Care Locations Clinic Hours Telephone Numbers     [facility]  [address] [place] [address]     Monday-Friday:     11:00am - 9:00pm    Saturday-Sunday:  9:00am - 5:00pm   [phone]     [facility]  [address] [place] [address]     Monday-Friday:      5:00pm - 9:00pm     Saturday-Sunday:  9:00am - 5:00pm   [phone]
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
## 2021-06-01 ENCOUNTER — OFFICE VISIT (OUTPATIENT)
Dept: FAMILY MEDICINE | Facility: CLINIC | Age: 51
End: 2021-06-01
Payer: COMMERCIAL

## 2021-06-01 VITALS
TEMPERATURE: 97.8 F | DIASTOLIC BLOOD PRESSURE: 77 MMHG | SYSTOLIC BLOOD PRESSURE: 112 MMHG | BODY MASS INDEX: 37.7 KG/M2 | HEART RATE: 66 BPM | WEIGHT: 220.8 LBS | OXYGEN SATURATION: 98 %

## 2021-06-01 DIAGNOSIS — F90.2 ATTENTION DEFICIT HYPERACTIVITY DISORDER (ADHD), COMBINED TYPE: ICD-10-CM

## 2021-06-01 DIAGNOSIS — F43.10 PTSD (POST-TRAUMATIC STRESS DISORDER): Primary | ICD-10-CM

## 2021-06-01 DIAGNOSIS — F41.9 ANXIETY: ICD-10-CM

## 2021-06-01 DIAGNOSIS — F32.0 MILD MAJOR DEPRESSION (H): ICD-10-CM

## 2021-06-01 PROCEDURE — 99214 OFFICE O/P EST MOD 30 MIN: CPT | Performed by: INTERNAL MEDICINE
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
## 2021-07-21 ENCOUNTER — MYC MEDICAL ADVICE (OUTPATIENT)
Dept: FAMILY MEDICINE | Facility: CLINIC | Age: 51
End: 2021-07-21

## 2021-07-27 ASSESSMENT — PATIENT HEALTH QUESTIONNAIRE - PHQ9
SUM OF ALL RESPONSES TO PHQ QUESTIONS 1-9: 15
10. IF YOU CHECKED OFF ANY PROBLEMS, HOW DIFFICULT HAVE THESE PROBLEMS MADE IT FOR YOU TO DO YOUR WORK, TAKE CARE OF THINGS AT HOME, OR GET ALONG WITH OTHER PEOPLE: SOMEWHAT DIFFICULT
SUM OF ALL RESPONSES TO PHQ QUESTIONS 1-9: 15
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
## 2021-07-28 ENCOUNTER — MYC MEDICAL ADVICE (OUTPATIENT)
Dept: FAMILY MEDICINE | Facility: CLINIC | Age: 51
End: 2021-07-28

## 2021-07-28 ENCOUNTER — VIRTUAL VISIT (OUTPATIENT)
Dept: PSYCHIATRY | Facility: CLINIC | Age: 51
End: 2021-07-28
Attending: INTERNAL MEDICINE
Payer: COMMERCIAL

## 2021-07-28 ENCOUNTER — VIRTUAL VISIT (OUTPATIENT)
Dept: BEHAVIORAL HEALTH | Facility: CLINIC | Age: 51
End: 2021-07-28
Payer: COMMERCIAL

## 2021-07-28 DIAGNOSIS — F43.10 PTSD (POST-TRAUMATIC STRESS DISORDER): Primary | ICD-10-CM

## 2021-07-28 DIAGNOSIS — F90.2 ATTENTION DEFICIT HYPERACTIVITY DISORDER (ADHD), COMBINED TYPE: ICD-10-CM

## 2021-07-28 DIAGNOSIS — F41.1 GENERALIZED ANXIETY DISORDER: ICD-10-CM

## 2021-07-28 DIAGNOSIS — F98.8 ATTENTION DEFICIT DISORDER WITHOUT HYPERACTIVITY: Primary | ICD-10-CM

## 2021-07-28 PROCEDURE — 99205 OFFICE O/P NEW HI 60 MIN: CPT | Mod: 95 | Performed by: NURSE PRACTITIONER

## 2021-07-28 PROCEDURE — 90791 PSYCH DIAGNOSTIC EVALUATION: CPT | Mod: 52 | Performed by: SOCIAL WORKER

## 2021-07-28 RX ORDER — CLONIDINE HYDROCHLORIDE 0.1 MG/1
0.1 TABLET ORAL 3 TIMES DAILY
Qty: 90 TABLET | Refills: 1 | Status: SHIPPED | OUTPATIENT
Start: 2021-07-28 | End: 2022-12-30

## 2021-07-28 ASSESSMENT — ANXIETY QUESTIONNAIRES
GAD7 TOTAL SCORE: 15
7. FEELING AFRAID AS IF SOMETHING AWFUL MIGHT HAPPEN: NEARLY EVERY DAY
IF YOU CHECKED OFF ANY PROBLEMS ON THIS QUESTIONNAIRE, HOW DIFFICULT HAVE THESE PROBLEMS MADE IT FOR YOU TO DO YOUR WORK, TAKE CARE OF THINGS AT HOME, OR GET ALONG WITH OTHER PEOPLE: VERY DIFFICULT
6. BECOMING EASILY ANNOYED OR IRRITABLE: SEVERAL DAYS
3. WORRYING TOO MUCH ABOUT DIFFERENT THINGS: MORE THAN HALF THE DAYS
5. BEING SO RESTLESS THAT IT IS HARD TO SIT STILL: NEARLY EVERY DAY
1. FEELING NERVOUS, ANXIOUS, OR ON EDGE: MORE THAN HALF THE DAYS
2. NOT BEING ABLE TO STOP OR CONTROL WORRYING: MORE THAN HALF THE DAYS

## 2021-07-28 ASSESSMENT — COLUMBIA-SUICIDE SEVERITY RATING SCALE - C-SSRS
1. IN THE PAST MONTH, HAVE YOU WISHED YOU WERE DEAD OR WISHED YOU COULD GO TO SLEEP AND NOT WAKE UP?: NO
2. HAVE YOU ACTUALLY HAD ANY THOUGHTS OF KILLING YOURSELF?: NO
6. HAVE YOU EVER DONE ANYTHING, STARTED TO DO ANYTHING, OR PREPARED TO DO ANYTHING TO END YOUR LIFE?: NO
ATTEMPT LIFETIME: NO
TOTAL  NUMBER OF ABORTED OR SELF INTERRUPTED ATTEMPTS PAST LIFETIME: NO
5. HAVE YOU STARTED TO WORK OUT OR WORKED OUT THE DETAILS OF HOW TO KILL YOURSELF? DO YOU INTEND TO CARRY OUT THIS PLAN?: NO
3. HAVE YOU BEEN THINKING ABOUT HOW YOU MIGHT KILL YOURSELF?: NO
TOTAL  NUMBER OF INTERRUPTED ATTEMPTS LIFETIME: NO
TOTAL  NUMBER OF ABORTED OR SELF INTERRUPTED ATTEMPTS PAST 3 MONTHS: NO
5. HAVE YOU STARTED TO WORK OUT OR WORKED OUT THE DETAILS OF HOW TO KILL YOURSELF? DO YOU INTEND TO CARRY OUT THIS PLAN?: NO
ATTEMPT PAST THREE MONTHS: NO
4. HAVE YOU HAD THESE THOUGHTS AND HAD SOME INTENTION OF ACTING ON THEM?: NO
6. HAVE YOU EVER DONE ANYTHING, STARTED TO DO ANYTHING, OR PREPARED TO DO ANYTHING TO END YOUR LIFE?: NO
1. IN THE PAST MONTH, HAVE YOU WISHED YOU WERE DEAD OR WISHED YOU COULD GO TO SLEEP AND NOT WAKE UP?: NO
TOTAL  NUMBER OF INTERRUPTED ATTEMPTS PAST 3 MONTHS: NO
2. HAVE YOU ACTUALLY HAD ANY THOUGHTS OF KILLING YOURSELF LIFETIME?: NO
4. HAVE YOU HAD THESE THOUGHTS AND HAD SOME INTENTION OF ACTING ON THEM?: NO

## 2021-07-28 ASSESSMENT — PATIENT HEALTH QUESTIONNAIRE - PHQ9
5. POOR APPETITE OR OVEREATING: MORE THAN HALF THE DAYS
SUM OF ALL RESPONSES TO PHQ QUESTIONS 1-9: 15
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
## 2021-07-28 NOTE — PROGRESS NOTES
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 Suicide Prevention Lifeline: 303.845.4054 (TTY: 126.807.8999). Call anytime for help.  (www.suicidepreventionlifeline.org)  National [place] on Mental Illness (www.sheyla.org): 633.332.8949 or 379-688-6151.   Mental Health Association (www.mentalhealth.org): 144.280.7017 or 982-582-7138.  [place] Crisis Text Line: Text [place] to 756971  Suicide LifeLine Chat: suicideMangrove Systemsline.org/chat    ADMINISTRATIVE BILLIN min spent interviewing patient, reviewing referral documents, obtaining and reviewing outside records, communication with other health specialists, and preparing this report on today's date  Video/Phone Start Time: 9:01 AM  Video/Phone End Time: 9:51 AM    Patient Status:  Our psychiatry providers act as a specialty service for Primary Care Providers in the [facility] that seek to optimize medications for unstable patients.  Once medications have been optimized, our providers discharge the patient back to the referring Primary Care Provider for ongoing medication management.  This type of system allows our  providers to serve a high volume of patients. At this time  Patient will continue to be seen for ongoing consultation and stabilization.    Signed:   Aleida Calhoun, MSN, APRN, FMHNP-[facility] ([facility])   Chart documentation done in part with Dragon Voice Recognition software.  Although reviewed after completion, some word and grammatical errors may remain.

## 2021-07-29 ASSESSMENT — ANXIETY QUESTIONNAIRES: GAD7 TOTAL SCORE: 15

## 2021-08-02 ENCOUNTER — MYC MEDICAL ADVICE (OUTPATIENT)
Dept: FAMILY MEDICINE | Facility: CLINIC | Age: 51
End: 2021-08-02

## 2021-10-09 ENCOUNTER — HEALTH MAINTENANCE LETTER (OUTPATIENT)
Age: 51
End: 2021-10-09

## 2021-10-19 PROBLEM — F32.9 MAJOR DEPRESSION: Status: ACTIVE | Noted: 2017-11-29

## 2022-07-16 ENCOUNTER — HEALTH MAINTENANCE LETTER (OUTPATIENT)
Age: 52
End: 2022-07-16

## 2022-09-17 ENCOUNTER — HEALTH MAINTENANCE LETTER (OUTPATIENT)
Age: 52
End: 2022-09-17

## 2022-12-16 ASSESSMENT — PATIENT HEALTH QUESTIONNAIRE - PHQ9
SUM OF ALL RESPONSES TO PHQ QUESTIONS 1-9: 21
SUM OF ALL RESPONSES TO PHQ QUESTIONS 1-9: 21
10. IF YOU CHECKED OFF ANY PROBLEMS, HOW DIFFICULT HAVE THESE PROBLEMS MADE IT FOR YOU TO DO YOUR WORK, TAKE CARE OF THINGS AT HOME, OR GET ALONG WITH OTHER PEOPLE: VERY DIFFICULT

## 2022-12-16 ASSESSMENT — ENCOUNTER SYMPTOMS
FREQUENCY: 0
SORE THROAT: 0
CONSTIPATION: 0
JOINT SWELLING: 0
DYSURIA: 0
DIARRHEA: 0
ARTHRALGIAS: 1
PARESTHESIAS: 0
EYE PAIN: 0
WEAKNESS: 0
NAUSEA: 0
COUGH: 0
ABDOMINAL PAIN: 0
DIZZINESS: 0
HEADACHES: 0
BREAST MASS: 0
NERVOUS/ANXIOUS: 1
HEMATURIA: 0
PALPITATIONS: 0
HEMATOCHEZIA: 0
FEVER: 0
CHILLS: 0
SHORTNESS OF BREATH: 0
HEARTBURN: 0
MYALGIAS: 1

## 2022-12-23 ENCOUNTER — ANCILLARY PROCEDURE (OUTPATIENT)
Dept: GENERAL RADIOLOGY | Facility: CLINIC | Age: 52
End: 2022-12-23
Attending: INTERNAL MEDICINE
Payer: COMMERCIAL

## 2022-12-23 ENCOUNTER — OFFICE VISIT (OUTPATIENT)
Dept: FAMILY MEDICINE | Facility: CLINIC | Age: 52
End: 2022-12-23
Payer: COMMERCIAL

## 2022-12-23 VITALS
OXYGEN SATURATION: 98 % | HEIGHT: 64 IN | BODY MASS INDEX: 36.19 KG/M2 | HEART RATE: 94 BPM | TEMPERATURE: 98.7 F | DIASTOLIC BLOOD PRESSURE: 74 MMHG | WEIGHT: 212 LBS | SYSTOLIC BLOOD PRESSURE: 127 MMHG

## 2022-12-23 DIAGNOSIS — M25.512 CHRONIC LEFT SHOULDER PAIN: Primary | ICD-10-CM

## 2022-12-23 DIAGNOSIS — Z11.1 SCREENING EXAMINATION FOR PULMONARY TUBERCULOSIS: ICD-10-CM

## 2022-12-23 DIAGNOSIS — F43.10 PTSD (POST-TRAUMATIC STRESS DISORDER): ICD-10-CM

## 2022-12-23 DIAGNOSIS — Z23 NEED FOR COVID-19 VACCINE: ICD-10-CM

## 2022-12-23 DIAGNOSIS — Z12.11 SCREEN FOR COLON CANCER: ICD-10-CM

## 2022-12-23 DIAGNOSIS — G89.29 CHRONIC LEFT SHOULDER PAIN: Primary | ICD-10-CM

## 2022-12-23 DIAGNOSIS — E55.9 VITAMIN D DEFICIENCY: ICD-10-CM

## 2022-12-23 DIAGNOSIS — F90.2 ATTENTION DEFICIT HYPERACTIVITY DISORDER (ADHD), COMBINED TYPE: ICD-10-CM

## 2022-12-23 DIAGNOSIS — Z12.31 VISIT FOR SCREENING MAMMOGRAM: ICD-10-CM

## 2022-12-23 DIAGNOSIS — M25.512 CHRONIC LEFT SHOULDER PAIN: ICD-10-CM

## 2022-12-23 DIAGNOSIS — E78.5 HYPERLIPIDEMIA LDL GOAL <100: ICD-10-CM

## 2022-12-23 DIAGNOSIS — G89.29 CHRONIC LEFT SHOULDER PAIN: ICD-10-CM

## 2022-12-23 DIAGNOSIS — F41.9 ANXIETY: ICD-10-CM

## 2022-12-23 LAB
ALBUMIN SERPL-MCNC: 3.5 G/DL (ref 3.4–5)
ALP SERPL-CCNC: 80 U/L (ref 40–150)
ALT SERPL W P-5'-P-CCNC: 18 U/L (ref 0–50)
ANION GAP SERPL CALCULATED.3IONS-SCNC: 7 MMOL/L (ref 3–14)
AST SERPL W P-5'-P-CCNC: 11 U/L (ref 0–45)
BASOPHILS # BLD AUTO: 0 10E3/UL (ref 0–0.2)
BASOPHILS NFR BLD AUTO: 0 %
BILIRUB SERPL-MCNC: 0.6 MG/DL (ref 0.2–1.3)
BUN SERPL-MCNC: 13 MG/DL (ref 7–30)
CALCIUM SERPL-MCNC: 8.9 MG/DL (ref 8.5–10.1)
CHLORIDE BLD-SCNC: 106 MMOL/L (ref 94–109)
CHOLEST SERPL-MCNC: 139 MG/DL
CO2 SERPL-SCNC: 26 MMOL/L (ref 20–32)
CREAT SERPL-MCNC: 0.66 MG/DL (ref 0.52–1.04)
EOSINOPHIL # BLD AUTO: 0.1 10E3/UL (ref 0–0.7)
EOSINOPHIL NFR BLD AUTO: 1 %
ERYTHROCYTE [DISTWIDTH] IN BLOOD BY AUTOMATED COUNT: 13.4 % (ref 10–15)
FASTING STATUS PATIENT QL REPORTED: NORMAL
GFR SERPL CREATININE-BSD FRML MDRD: >90 ML/MIN/1.73M2
GLUCOSE BLD-MCNC: 88 MG/DL (ref 70–99)
HCT VFR BLD AUTO: 40.9 % (ref 35–47)
HDLC SERPL-MCNC: 58 MG/DL
HGB BLD-MCNC: 13.4 G/DL (ref 11.7–15.7)
LDLC SERPL CALC-MCNC: 61 MG/DL
LYMPHOCYTES # BLD AUTO: 2.2 10E3/UL (ref 0.8–5.3)
LYMPHOCYTES NFR BLD AUTO: 18 %
MCH RBC QN AUTO: 31.1 PG (ref 26.5–33)
MCHC RBC AUTO-ENTMCNC: 32.8 G/DL (ref 31.5–36.5)
MCV RBC AUTO: 95 FL (ref 78–100)
MONOCYTES # BLD AUTO: 0.7 10E3/UL (ref 0–1.3)
MONOCYTES NFR BLD AUTO: 5 %
NEUTROPHILS # BLD AUTO: 9.1 10E3/UL (ref 1.6–8.3)
NEUTROPHILS NFR BLD AUTO: 76 %
NONHDLC SERPL-MCNC: 81 MG/DL
PLATELET # BLD AUTO: 305 10E3/UL (ref 150–450)
POTASSIUM BLD-SCNC: 3.5 MMOL/L (ref 3.4–5.3)
PROT SERPL-MCNC: 7.1 G/DL (ref 6.8–8.8)
RBC # BLD AUTO: 4.31 10E6/UL (ref 3.8–5.2)
SODIUM SERPL-SCNC: 139 MMOL/L (ref 133–144)
T4 FREE SERPL-MCNC: 1 NG/DL (ref 0.76–1.46)
TRIGL SERPL-MCNC: 98 MG/DL
TSH SERPL DL<=0.005 MIU/L-ACNC: 4.51 MU/L (ref 0.4–4)
WBC # BLD AUTO: 12.1 10E3/UL (ref 4–11)

## 2022-12-23 PROCEDURE — 99396 PREV VISIT EST AGE 40-64: CPT | Mod: 25 | Performed by: INTERNAL MEDICINE

## 2022-12-23 PROCEDURE — 73030 X-RAY EXAM OF SHOULDER: CPT | Mod: TC | Performed by: RADIOLOGY

## 2022-12-23 PROCEDURE — 80061 LIPID PANEL: CPT | Performed by: INTERNAL MEDICINE

## 2022-12-23 PROCEDURE — 86481 TB AG RESPONSE T-CELL SUSP: CPT | Performed by: INTERNAL MEDICINE

## 2022-12-23 PROCEDURE — 84439 ASSAY OF FREE THYROXINE: CPT | Performed by: INTERNAL MEDICINE

## 2022-12-23 PROCEDURE — 82306 VITAMIN D 25 HYDROXY: CPT | Performed by: INTERNAL MEDICINE

## 2022-12-23 PROCEDURE — 91313 COVID-19 VACCINE BIVALENT BOOSTER 18+ (MODERNA): CPT | Performed by: INTERNAL MEDICINE

## 2022-12-23 PROCEDURE — 0134A COVID-19 VACCINE BIVALENT BOOSTER 18+ (MODERNA): CPT | Performed by: INTERNAL MEDICINE

## 2022-12-23 PROCEDURE — 36415 COLL VENOUS BLD VENIPUNCTURE: CPT | Performed by: INTERNAL MEDICINE

## 2022-12-23 PROCEDURE — 80050 GENERAL HEALTH PANEL: CPT | Performed by: INTERNAL MEDICINE

## 2022-12-23 PROCEDURE — 99213 OFFICE O/P EST LOW 20 MIN: CPT | Mod: 25 | Performed by: INTERNAL MEDICINE

## 2022-12-23 RX ORDER — BUSPIRONE HYDROCHLORIDE 10 MG/1
10 TABLET ORAL 3 TIMES DAILY
Qty: 90 TABLET | Refills: 4 | Status: SHIPPED | OUTPATIENT
Start: 2022-12-23 | End: 2023-12-08

## 2022-12-23 ASSESSMENT — ENCOUNTER SYMPTOMS
WEAKNESS: 0
HEADACHES: 0
SHORTNESS OF BREATH: 0
EYE PAIN: 0
PARESTHESIAS: 0
ARTHRALGIAS: 1
DIZZINESS: 0
DYSURIA: 0
PALPITATIONS: 0
HEMATOCHEZIA: 0
CHILLS: 0
NAUSEA: 0
BREAST MASS: 0
HEARTBURN: 0
COUGH: 0
SORE THROAT: 0
CONSTIPATION: 0
NERVOUS/ANXIOUS: 1
MYALGIAS: 1
FREQUENCY: 0
FEVER: 0
HEMATURIA: 0
ABDOMINAL PAIN: 0
JOINT SWELLING: 0
DIARRHEA: 0

## 2022-12-23 NOTE — PROGRESS NOTES
SUBJECTIVE:   CC: Mo is an 52 year old who presents for preventive health visit.       Healthy Habits:     Getting at least 3 servings of Calcium per day:  NO    Bi-annual eye exam:  Yes    Dental care twice a year:  NO    Sleep apnea or symptoms of sleep apnea:  Daytime drowsiness    Diet:  Regular (no restrictions)    Frequency of exercise:  None    Taking medications regularly:  No    Barriers to taking medications:  Problems remembering to take them    Medication side effects:  None    PHQ-2 Total Score: 6    Additional concerns today:  Yes    Answers for HPI/ROS submitted by the patient on 12/16/2022  If you checked off any problems, how difficult have these problems made it for you to do your work, take care of things at home, or get along with other people?: Very difficult  PHQ9 TOTAL SCORE: 21 2019 colonoscopy normal [place]         Today's PHQ-2 Score:   PHQ-2 ( 1999 Pfizer) 12/16/2022   Q1: Little interest or pleasure in doing things 3   Q2: Feeling down, depressed or hopeless 3   PHQ-2 Score 6   PHQ-2 Total Score (12-17 Years)- Positive if 3 or more points; Administer PHQ-A if positive -   Q1: Little interest or pleasure in doing things Nearly every day   Q2: Feeling down, depressed or hopeless Nearly every day   PHQ-2 Score 6       Clonidine - 1/2 dose fog ----->  PTSD,   Initially on prozac in the 90s  Then start taking zoloft   Then paxil ----  1-2 in the morning and then 3-4 AM then 8-10   Insomnia through the roof   Eating 1 meal a day and eating sugar .  Left shoulder has been acting up   Small amount .edge off the pain   Feels before pins and needles     Social History     Tobacco Use     Smoking status: Never     Passive exposure: Never     Smokeless tobacco: Never   Substance Use Topics     Alcohol use: No     If you drink alcohol do you typically have >3 drinks per day or >7 drinks per week? No    Alcohol Use 12/23/2022   Prescreen: >3 drinks/day or >7 drinks/week? -   Prescreen: >3  drinks/day or >7 drinks/week? No     Reviewed orders with patient.  Reviewed health maintenance and updated orders accordingly - Yes  Lab work is in process  Labs reviewed in EPIC  BP Readings from Last 3 Encounters:   12/23/22 127/74   06/01/21 112/77   05/21/21 119/83    Wt Readings from Last 3 Encounters:   12/23/22 96.2 kg (212 lb)   06/01/21 100.2 kg (220 lb 12.8 oz)   05/21/21 98.4 kg (217 lb)                  Patient Active Problem List   Diagnosis     Anxiety     PTSD (post-traumatic stress disorder)     Mild major depression (H)     Attention deficit hyperactivity disorder (ADHD), combined type     Past Surgical History:   Procedure Laterality Date     CHOLECYSTECTOMY  08/2019     COLONOSCOPY  08/2019     GYN SURGERY      C section x2     TUBAL LIGATION Bilateral 1999       Social History     Tobacco Use     Smoking status: Never     Passive exposure: Never     Smokeless tobacco: Never   Substance Use Topics     Alcohol use: No     Family History   Problem Relation Age of Onset     Unknown/Adopted Mother      Thyroid Disease Mother         lumps and low thyroide     Substance Abuse Mother      Unknown/Adopted Father      Coronary Artery Disease Maternal Grandfather      Hypertension Maternal Grandfather      Hyperlipidemia Maternal Grandfather      Depression Maternal Grandfather      Anxiety Disorder Maternal Grandfather      Mental Illness Maternal Grandfather         mental break downs     Alcoholism Maternal Grandfather      Hyperlipidemia Maternal Grandmother      Other Cancer Maternal Grandmother         had to have a full hystorectomy a few years after her second child was born     Mental Illness Maternal Grandmother         bi polar, schizophrenia, anxiety     Thyroid Disease Maternal Grandmother         low thyroid     Obesity Maternal Grandmother      Thyroid Disease Other         thyroid removed at age 29 because of H[place]otos         Current Outpatient Medications   Medication Sig Dispense  Refill     busPIRone (BUSPAR) 10 MG tablet Take 1 tablet (10 mg) by mouth 3 times daily 90 tablet 4     levonorgestrel (MIRENA, 52 MG,) 20 MCG/24HR IUD One device, intrauterine, for up to 5 years. 1 each 0     cloNIDine (CATAPRES) 0.1 MG tablet Take 1 tablet (0.1 mg) by mouth 3 times daily 90 tablet 1     Allergies   Allergen Reactions     Latex Other (See Comments)     Chemical burn     Augmentin Hives     Able to tolerate PCN- allergic to the clavulante     Wasps [Hornets]      Erythromycin Nausea and Vomiting       Breast Cancer Screening:    FHS-7: No flowsheet data found.  click delete button to remove this line now  Mammogram Screening: Recommended annual mammography  Pertinent mammograms are reviewed under the imaging tab.    History of abnormal Pap smear: NO - age 30-65 PAP every 5 years with negative HPV co-testing recommended  PAP / HPV Latest Ref Rng & Units 4/22/2021   PAP (Historical) - NIL   HPV16 NEG:Negative Negative   HPV18 NEG:Negative Negative   HRHPV NEG:Negative Negative     Reviewed and updated as needed this visit by clinical staff   Tobacco  Allergies  Meds              Reviewed and updated as needed this visit by Provider                     Review of Systems   Constitutional: Negative for chills and fever.   HENT: Negative for congestion, ear pain, hearing loss and sore throat.    Eyes: Negative for pain and visual disturbance.   Respiratory: Negative for cough and shortness of breath.    Cardiovascular: Negative for chest pain, palpitations and peripheral edema.   Gastrointestinal: Negative for abdominal pain, constipation, diarrhea, heartburn, hematochezia and nausea.   Breasts:  Negative for tenderness, breast mass and discharge.   Genitourinary: Negative for dysuria, frequency, genital sores, hematuria, pelvic pain, urgency, vaginal bleeding and vaginal discharge.   Musculoskeletal: Positive for arthralgias and myalgias. Negative for joint swelling.   Skin: Negative for rash.  "  Neurological: Negative for dizziness, weakness, headaches and paresthesias.   Psychiatric/Behavioral: Positive for mood changes. The patient is nervous/anxious.      CONSTITUTIONAL: NEGATIVE for fever, chills, change in weight  INTEGUMENTARU/SKIN: NEGATIVE for worrisome rashes, moles or lesions  EYES: NEGATIVE for vision changes or irritation  ENT: NEGATIVE for ear, mouth and throat problems  RESP: NEGATIVE for significant cough or SOB  BREAST: NEGATIVE for masses, tenderness or discharge  CV: NEGATIVE for chest pain, palpitations or peripheral edema  GI: NEGATIVE for nausea, abdominal pain, heartburn, or change in bowel habits  : NEGATIVE for unusual urinary or vaginal symptoms. Periods are regular.  MUSCULOSKELETAL: NEGATIVE for significant arthralgias or myalgia  NEURO: NEGATIVE for weakness, dizziness or paresthesias  PSYCHIATRIC: NEGATIVE for changes in mood or affect     OBJECTIVE:   /74 (BP Location: Right arm, Patient Position: Chair, Cuff Size: Adult Large)   Pulse 94   Temp 98.7  F (37.1  C)   Ht 1.628 m (5' 4.1\")   Wt 96.2 kg (212 lb)   SpO2 98%   BMI 36.28 kg/m    Physical Exam  GENERAL: healthy, alert and no distress  EYES: Eyes grossly normal to inspection, PERRL and conjunctivae and sclerae normal  HENT: ear canals and TM's normal, nose and mouth without ulcers or lesions  NECK: no adenopathy, no asymmetry, masses, or scars and thyroid normal to palpation  RESP: lungs clear to auscultation - no rales, rhonchi or wheezes  CV: regular rate and rhythm, normal S1 S2, no S3 or S4, no murmur, click or rub, no peripheral edema and peripheral pulses strong  ABDOMEN: soft, nontender, no hepatosplenomegaly, no masses and bowel sounds normal  MS: no gross musculoskeletal defects noted, no edema  SKIN: no suspicious lesions or rashes  NEURO: Normal strength and tone, mentation intact and speech normal  BACK: no CVA tenderness, no paralumbar tenderness  PSYCH: mentation appears normal, affect " normal/bright  LYMPH: no cervical, supraclavicular, axillary, or inguinal adenopathy    Diagnostic Test Results:  Labs reviewed in Epic  Results for orders placed or performed in visit on 12/23/22   XR Shoulder Left 2 Views     Status: None    Narrative    XR SHOULDER LEFT 2 VIEWS   12/23/2022 4:27 PM     HISTORY:  Chronic left shoulder pain; Chronic left shoulder pain      Impression    IMPRESSION:  Negative exam.    ADRIANE MORAN MD         SYSTEM ID:  [id number]   Results for orders placed or performed in visit on 12/23/22   TSH with free T4 reflex     Status: Abnormal   Result Value Ref Range    TSH 4.51 (H) 0.40 - 4.00 mU/L   Comprehensive metabolic panel (BMP + Alb, Alk Phos, ALT, AST, Total. Bili, TP)     Status: Normal   Result Value Ref Range    Sodium 139 133 - 144 mmol/L    Potassium 3.5 3.4 - 5.3 mmol/L    Chloride 106 94 - 109 mmol/L    Carbon Dioxide (CO2) 26 20 - 32 mmol/L    Anion Gap 7 3 - 14 mmol/L    Urea Nitrogen 13 7 - 30 mg/dL    Creatinine 0.66 0.52 - 1.04 mg/dL    Calcium 8.9 8.5 - 10.1 mg/dL    Glucose 88 70 - 99 mg/dL    Alkaline Phosphatase 80 40 - 150 U/L    AST 11 0 - 45 U/L    ALT 18 0 - 50 U/L    Protein Total 7.1 6.8 - 8.8 g/dL    Albumin 3.5 3.4 - 5.0 g/dL    Bilirubin Total 0.6 0.2 - 1.3 mg/dL    GFR Estimate >90 >60 mL/min/1.73m2   Vitamin D Deficiency     Status: Normal   Result Value Ref Range    Vitamin D, Total (25-Hydroxy) 20 20 - 75 ug/L    Narrative    Season, race, dietary intake, and treatment affect the concentration of 25-hydroxy-Vitamin D. Values may decrease during winter months and increase during summer months. Values 20-29 ug/L may indicate Vitamin D insufficiency and values <20 ug/L may indicate Vitamin D deficiency.    Vitamin D determination is routinely performed by an immunoassay specific for 25 hydroxyvitamin D3.  If an individual is on vitamin D2(ergocalciferol) supplementation, please specify 25 OH vitamin D2 and D3 level determination by LCMSMS test  VITD23.     Lipid panel reflex to direct LDL Fasting     Status: None   Result Value Ref Range    Cholesterol 139 <200 mg/dL    Triglycerides 98 <150 mg/dL    Direct Measure HDL 58 >=50 mg/dL    LDL Cholesterol Calculated 61 <=100 mg/dL    Non HDL Cholesterol 81 <130 mg/dL    Patient Fasting > 8hrs? Unknown     Narrative    Cholesterol  Desirable:  <200 mg/dL    Triglycerides  Normal:  Less than 150 mg/dL  Borderline High:  150-199 mg/dL  High:  200-499 mg/dL  Very High:  Greater than or equal to 500 mg/dL    Direct Measure HDL  Female:  Greater than or equal to 50 mg/dL   Male:  Greater than or equal to 40 mg/dL    LDL Cholesterol  Desirable:  <100mg/dL  Above Desirable:  100-129 mg/dL   Borderline High:  130-159 mg/dL   High:  160-189 mg/dL   Very High:  >= 190 mg/dL    Non HDL Cholesterol  Desirable:  130 mg/dL  Above Desirable:  130-159 mg/dL  Borderline High:  160-189 mg/dL  High:  190-219 mg/dL  Very High:  Greater than or equal to 220 mg/dL   CBC with platelets and differential     Status: Abnormal   Result Value Ref Range    WBC Count 12.1 (H) 4.0 - 11.0 10e3/uL    RBC Count 4.31 3.80 - 5.20 10e6/uL    Hemoglobin 13.4 11.7 - 15.7 g/dL    Hematocrit 40.9 35.0 - 47.0 %    MCV 95 78 - 100 fL    MCH 31.1 26.5 - 33.0 pg    MCHC 32.8 31.5 - 36.5 g/dL    RDW 13.4 10.0 - 15.0 %    Platelet Count 305 150 - 450 10e3/uL    % Neutrophils 76 %    % Lymphocytes 18 %    % Monocytes 5 %    % Eosinophils 1 %    % Basophils 0 %    Absolute Neutrophils 9.1 (H) 1.6 - 8.3 10e3/uL    Absolute Lymphocytes 2.2 0.8 - 5.3 10e3/uL    Absolute Monocytes 0.7 0.0 - 1.3 10e3/uL    Absolute Eosinophils 0.1 0.0 - 0.7 10e3/uL    Absolute Basophils 0.0 0.0 - 0.2 10e3/uL   T4 free     Status: Normal   Result Value Ref Range    Free T4 1.00 0.76 - 1.46 ng/dL   Quantiferon TB Gold Plus Grey Tube     Status: None    Specimen: Peripheral Blood   Result Value Ref Range    Quantiferon Nil Tube 0.01 IU/mL   Quantiferon TB Gold Plus Green Tube      Status: None    Specimen: Peripheral Blood   Result Value Ref Range    Quantiferon TB1 Tube 0.06 IU/mL   Quantiferon TB Gold Plus Yellow Tube     Status: None    Specimen: Peripheral Blood   Result Value Ref Range    Quantiferon TB2 Tube 0.08    Quantiferon TB Gold Plus Purple Tube     Status: None    Specimen: Peripheral Blood   Result Value Ref Range    Quantiferon Mitogen 10.00 IU/mL   Quantiferon TB Gold Plus     Status: None    Specimen: Peripheral Blood   Result Value Ref Range    Quantiferon-TB Gold Plus Negative Negative    TB1 Ag minus Nil Value 0.05 IU/mL    TB2 Ag minus Nil Value 0.07 IU/mL    Mitogen minus Nil Result 9.99 IU/mL    Nil Result 0.01 IU/mL   CBC with platelets and differential     Status: Abnormal    Narrative    The following orders were created for panel order CBC with platelets and differential.  Procedure                               Abnormality         Status                     ---------                               -----------         ------                     CBC with platelets and d...[[id number]]  Abnormal            Final result                 Please view results for these tests on the individual orders.   Quantiferon TB Gold Plus     Status: None    Specimen: Peripheral Blood    Narrative    The following orders were created for panel order Quantiferon TB Gold Plus.  Procedure                               Abnormality         Status                     ---------                               -----------         ------                     Quantiferon TB Gold Plus[[id number]]                         Final result               Quantiferon TB Gold Plus...[[id number]]                      Final result               Quantiferon TB Gold Plus...[[id number]]                      Final result               Quantiferon TB Gold Plus...[[id number]]                      Final result               Quantiferon TB Gold Plus...[[id number]]                      Final result                 Please  "view results for these tests on the individual orders.       ASSESSMENT/PLAN:   Elly was seen today for physical.    Diagnoses and all orders for this visit:    Chronic left shoulder pain  -     XR Shoulder Left 2 Views; Future    Screen for colon cancer    Visit for screening mammogram  -     MA SCREENING DIGITAL BILAT - Future  (s+30); Future    PTSD (post-traumatic stress disorder)  -     TSH with free T4 reflex; Future  -     CBC with platelets and differential; Future  -     TSH with free T4 reflex  -     CBC with platelets and differential  -     T4 free    Attention deficit hyperactivity disorder (ADHD), combined type  -     Comprehensive metabolic panel (BMP + Alb, Alk Phos, ALT, AST, Total. Bili, TP); Future  -     Comprehensive metabolic panel (BMP + Alb, Alk Phos, ALT, AST, Total. Bili, TP)    Anxiety  -     busPIRone (BUSPAR) 10 MG tablet; Take 1 tablet (10 mg) by mouth 3 times daily    Vitamin D deficiency  -     Vitamin D Deficiency; Future  -     Vitamin D Deficiency    Hyperlipidemia LDL goal <100  -     REVIEW OF HEALTH MAINTENANCE PROTOCOL ORDERS  -     Lipid panel reflex to direct LDL Fasting; Future  -     Lipid panel reflex to direct LDL Fasting    Need for COVID-19 vaccine  -     COVID-19 VACCINE BIVALENT BOOSTER 18+ (MODERNA)    Screening examination for pulmonary tuberculosis  -     Quantiferon TB Gold Plus; Future  -     Quantiferon TB Gold Plus              COUNSELING:  Reviewed preventive health counseling, as reflected in patient instructions       Regular exercise       Healthy diet/nutrition       Vision screening       Hearing screening       Colorectal Cancer Screening      BMI:   Estimated body mass index is 36.28 kg/m  as calculated from the following:    Height as of this encounter: 1.628 m (5' 4.1\").    Weight as of this encounter: 96.2 kg (212 lb).   Weight management plan: Discussed healthy diet and exercise guidelines      She reports that she has never smoked. She has never " been exposed to tobacco smoke. She has never used smokeless tobacco.          David Tracy MD  [facility]

## 2022-12-23 NOTE — LETTER
January 24, 2023    Elly Golden  [address] [place] [id number]      Dear Rupesh Sanabria is a copy of your results.  Results for orders placed or performed in visit on 12/23/22   XR Shoulder Left 2 Views     Status: None    Narrative    XR SHOULDER LEFT 2 VIEWS   12/23/2022 4:27 PM     HISTORY:  Chronic left shoulder pain; Chronic left shoulder pain      Impression    IMPRESSION:  Negative exam.    ADRIANE MORAN MD         SYSTEM ID:  [id number]   Results for orders placed or performed in visit on 12/23/22   TSH with free T4 reflex     Status: Abnormal   Result Value Ref Range    TSH 4.51 (H) 0.40 - 4.00 mU/L   Comprehensive metabolic panel (BMP + Alb, Alk Phos, ALT, AST, Total. Bili, TP)     Status: Normal   Result Value Ref Range    Sodium 139 133 - 144 mmol/L    Potassium 3.5 3.4 - 5.3 mmol/L    Chloride 106 94 - 109 mmol/L    Carbon Dioxide (CO2) 26 20 - 32 mmol/L    Anion Gap 7 3 - 14 mmol/L    Urea Nitrogen 13 7 - 30 mg/dL    Creatinine 0.66 0.52 - 1.04 mg/dL    Calcium 8.9 8.5 - 10.1 mg/dL    Glucose 88 70 - 99 mg/dL    Alkaline Phosphatase 80 40 - 150 U/L    AST 11 0 - 45 U/L    ALT 18 0 - 50 U/L    Protein Total 7.1 6.8 - 8.8 g/dL    Albumin 3.5 3.4 - 5.0 g/dL    Bilirubin Total 0.6 0.2 - 1.3 mg/dL    GFR Estimate >90 >60 mL/min/1.73m2   Vitamin D Deficiency     Status: Normal   Result Value Ref Range    Vitamin D, Total (25-Hydroxy) 20 20 - 75 ug/L    Narrative    Season, race, dietary intake, and treatment affect the concentration of 25-hydroxy-Vitamin D. Values may decrease during winter months and increase during summer months. Values 20-29 ug/L may indicate Vitamin D insufficiency and values <20 ug/L may indicate Vitamin D deficiency.    Vitamin D determination is routinely performed by an immunoassay specific for 25 hydroxyvitamin D3.  If an individual is on vitamin D2(ergocalciferol) supplementation, please specify 25 OH vitamin D2 and D3 level  determination by LCMSMS test [id number].     Lipid panel reflex to direct LDL Fasting     Status: None   Result Value Ref Range    Cholesterol 139 <200 mg/dL    Triglycerides 98 <150 mg/dL    Direct Measure HDL 58 >=50 mg/dL    LDL Cholesterol Calculated 61 <=100 mg/dL    Non HDL Cholesterol 81 <130 mg/dL    Patient Fasting > 8hrs? Unknown     Narrative    Cholesterol  Desirable:  <200 mg/dL    Triglycerides  Normal:  Less than 150 mg/dL  Borderline High:  150-199 mg/dL  High:  200-499 mg/dL  Very High:  Greater than or equal to 500 mg/dL    Direct Measure HDL  Female:  Greater than or equal to 50 mg/dL   Male:  Greater than or equal to 40 mg/dL    LDL Cholesterol  Desirable:  <100mg/dL  Above Desirable:  100-129 mg/dL   Borderline High:  130-159 mg/dL   High:  160-189 mg/dL   Very High:  >= 190 mg/dL    Non HDL Cholesterol  Desirable:  130 mg/dL  Above Desirable:  130-159 mg/dL  Borderline High:  160-189 mg/dL  High:  190-219 mg/dL  Very High:  Greater than or equal to 220 mg/dL   CBC with platelets and differential     Status: Abnormal   Result Value Ref Range    WBC Count 12.1 (H) 4.0 - 11.0 10e3/uL    RBC Count 4.31 3.80 - 5.20 10e6/uL    Hemoglobin 13.4 11.7 - 15.7 g/dL    Hematocrit 40.9 35.0 - 47.0 %    MCV 95 78 - 100 fL    MCH 31.1 26.5 - 33.0 pg    MCHC 32.8 31.5 - 36.5 g/dL    RDW 13.4 10.0 - 15.0 %    Platelet Count 305 150 - 450 10e3/uL    % Neutrophils 76 %    % Lymphocytes 18 %    % Monocytes 5 %    % Eosinophils 1 %    % Basophils 0 %    Absolute Neutrophils 9.1 (H) 1.6 - 8.3 10e3/uL    Absolute Lymphocytes 2.2 0.8 - 5.3 10e3/uL    Absolute Monocytes 0.7 0.0 - 1.3 10e3/uL    Absolute Eosinophils 0.1 0.0 - 0.7 10e3/uL    Absolute Basophils 0.0 0.0 - 0.2 10e3/uL   T4 free     Status: Normal   Result Value Ref Range    Free T4 1.00 0.76 - 1.46 ng/dL   Quantiferon TB Gold Plus Grey Tube     Status: None    Specimen: Peripheral Blood   Result Value Ref Range    Quantiferon Nil Tube 0.01 IU/mL   Quantiferon TB  Gold Plus Green Tube     Status: None    Specimen: Peripheral Blood   Result Value Ref Range    Quantiferon TB1 Tube 0.06 IU/mL   Quantiferon TB Gold Plus Yellow Tube     Status: None    Specimen: Peripheral Blood   Result Value Ref Range    Quantiferon TB2 Tube 0.08    Quantiferon TB Gold Plus Purple Tube     Status: None    Specimen: Peripheral Blood   Result Value Ref Range    Quantiferon Mitogen 10.00 IU/mL   Quantiferon TB Gold Plus     Status: None    Specimen: Peripheral Blood   Result Value Ref Range    Quantiferon-TB Gold Plus Negative Negative    TB1 Ag minus Nil Value 0.05 IU/mL    TB2 Ag minus Nil Value 0.07 IU/mL    Mitogen minus Nil Result 9.99 IU/mL    Nil Result 0.01 IU/mL   CBC with platelets and differential     Status: Abnormal    Narrative    The following orders were created for panel order CBC with platelets and differential.  Procedure                               Abnormality         Status                     ---------                               -----------         ------                     CBC with platelets and d...[[id number]]  Abnormal            Final result                 Please view results for these tests on the individual orders.   Quantiferon TB Gold Plus     Status: None    Specimen: Peripheral Blood    Narrative    The following orders were created for panel order Quantiferon TB Gold Plus.  Procedure                               Abnormality         Status                     ---------                               -----------         ------                     Quantiferon TB Gold Plus[[id number]]                         Final result               Quantiferon TB Gold Plus...[[id number]]                      Final result               Quantiferon TB Gold Plus...[[id number]]                      Final result               Quantiferon TB Gold Plus...[[id number]]                      Final result               Quantiferon TB Gold Plus...[[id number]]                      Final result                  Please view results for these tests on the individual orders.       If you have any questions or concerns, please call myself or my nurse at [phone].      Sincerely,        David Tracy {PROVIDER CREDENTIALS:[id number]}/dt

## 2022-12-24 LAB — DEPRECATED CALCIDIOL+CALCIFEROL SERPL-MC: 20 UG/L (ref 20–75)

## 2022-12-25 LAB
GAMMA INTERFERON BACKGROUND BLD IA-ACNC: 0.01 IU/ML
M TB IFN-G BLD-IMP: NEGATIVE
M TB IFN-G CD4+ BCKGRND COR BLD-ACNC: 9.99 IU/ML
MITOGEN IGNF BCKGRD COR BLD-ACNC: 0.05 IU/ML
MITOGEN IGNF BCKGRD COR BLD-ACNC: 0.07 IU/ML
QUANTIFERON MITOGEN: 10 IU/ML
QUANTIFERON NIL TUBE: 0.01 IU/ML
QUANTIFERON TB1 TUBE: 0.06 IU/ML
QUANTIFERON TB2 TUBE: 0.08

## 2022-12-26 ENCOUNTER — MYC MEDICAL ADVICE (OUTPATIENT)
Dept: FAMILY MEDICINE | Facility: CLINIC | Age: 52
End: 2022-12-26

## 2022-12-26 DIAGNOSIS — F41.9 ANXIETY: Primary | ICD-10-CM

## 2023-01-04 RX ORDER — BUSPIRONE HYDROCHLORIDE 15 MG/1
15 TABLET ORAL 3 TIMES DAILY
Qty: 180 TABLET | Refills: 4 | Status: SHIPPED | OUTPATIENT
Start: 2023-01-04 | End: 2023-12-08

## 2023-01-24 ENCOUNTER — MYC MEDICAL ADVICE (OUTPATIENT)
Dept: FAMILY MEDICINE | Facility: CLINIC | Age: 53
End: 2023-01-24
Payer: COMMERCIAL

## 2023-01-24 NOTE — LETTER
January 24, 2023      Pako Golden  [address] [place] [address]        Dear ,    The TB test is negative  Specimen Collected: 12/23/22  4:20 PM  Value Ref Range  Quantiferon Nil Tube 0.01 IU/mL  Value Ref Range  Quantiferon TB1 Tube 0.06 IU/mL  Value Ref Range  Quantiferon TB2 Tube 0.08   Value Ref Range  Quantiferon Mitogen 10.00 IU/mL  Value Ref Range  Quantiferon-TB Gold Plus Negative   TB1 Ag minus Nil Value  0.05 IU/mL  TB2 Ag minus Nil Value  0.07 IU/mL  Mitogen minus Nil Result  9.99 IU/mL  Nil Result    0.01 IU/mL    If you have any questions or concerns, please call the clinic at the number listed above.       Sincerely,      Electronically signed by:  David Tracy MD

## 2023-01-27 ENCOUNTER — ANCILLARY PROCEDURE (OUTPATIENT)
Dept: MAMMOGRAPHY | Facility: CLINIC | Age: 53
End: 2023-01-27
Attending: INTERNAL MEDICINE
Payer: COMMERCIAL

## 2023-01-27 DIAGNOSIS — Z12.31 VISIT FOR SCREENING MAMMOGRAM: ICD-10-CM

## 2023-01-27 PROCEDURE — 77067 SCR MAMMO BI INCL CAD: CPT | Mod: TC | Performed by: RADIOLOGY

## 2023-01-27 PROCEDURE — 77063 BREAST TOMOSYNTHESIS BI: CPT | Mod: TC | Performed by: RADIOLOGY

## 2023-02-21 ENCOUNTER — OFFICE VISIT (OUTPATIENT)
Dept: OPTOMETRY | Facility: CLINIC | Age: 53
End: 2023-02-21
Payer: COMMERCIAL

## 2023-02-21 DIAGNOSIS — Z01.00 ENCOUNTER FOR EXAMINATION OF EYES AND VISION WITHOUT ABNORMAL FINDINGS: Primary | ICD-10-CM

## 2023-02-21 DIAGNOSIS — H52.4 PRESBYOPIA: ICD-10-CM

## 2023-02-21 DIAGNOSIS — H52.13 MYOPIA OF BOTH EYES: ICD-10-CM

## 2023-02-21 DIAGNOSIS — H52.223 REGULAR ASTIGMATISM OF BOTH EYES: ICD-10-CM

## 2023-02-21 PROCEDURE — 92014 COMPRE OPH EXAM EST PT 1/>: CPT | Performed by: OPTOMETRIST

## 2023-02-21 PROCEDURE — 92015 DETERMINE REFRACTIVE STATE: CPT | Performed by: OPTOMETRIST

## 2023-02-21 ASSESSMENT — EXTERNAL EXAM - LEFT EYE: OS_EXAM: NORMAL

## 2023-02-21 ASSESSMENT — CONF VISUAL FIELD
OS_SUPERIOR_NASAL_RESTRICTION: 0
OS_INFERIOR_TEMPORAL_RESTRICTION: 0
OS_SUPERIOR_TEMPORAL_RESTRICTION: 0
OD_SUPERIOR_TEMPORAL_RESTRICTION: 0
OD_INFERIOR_NASAL_RESTRICTION: 0
OS_INFERIOR_NASAL_RESTRICTION: 0
OD_INFERIOR_TEMPORAL_RESTRICTION: 0
OD_SUPERIOR_NASAL_RESTRICTION: 0
OS_NORMAL: 1
METHOD: COUNTING FINGERS
OD_NORMAL: 1

## 2023-02-21 ASSESSMENT — TONOMETRY
OD_IOP_MMHG: 16
IOP_METHOD: APPLANATION
OS_IOP_MMHG: 16

## 2023-02-21 ASSESSMENT — REFRACTION_WEARINGRX
OS_AXIS: 080
OS_CYLINDER: +2.25
SPECS_TYPE: BIFOCAL
OD_AXIS: 109
OS_ADD: +1.50
OD_ADD: +1.50
OS_SPHERE: -4.00
OD_CYLINDER: +1.50
OD_SPHERE: -3.50

## 2023-02-21 ASSESSMENT — SLIT LAMP EXAM - LIDS
COMMENTS: 2+ BLEPHARITIS
COMMENTS: 2+ BLEPHARITIS

## 2023-02-21 ASSESSMENT — VISUAL ACUITY
OS_SC: 20/150
OD_SC: 20/20
METHOD: SNELLEN - LINEAR
OD_SC: 20/150
OS_SC: 20/20
OD_CC: 20/20
OD_CC: 20/20
OS_CC: 20/25
OS_CC: 20/20
CORRECTION_TYPE: GLASSES

## 2023-02-21 ASSESSMENT — REFRACTION_MANIFEST
OS_CYLINDER: +1.50
OD_CYLINDER: +1.25
OS_AXIS: 071
OD_SPHERE: -3.75
OS_ADD: +2.00
OD_AXIS: 098
OS_SPHERE: -3.75
OD_ADD: +2.00

## 2023-02-21 ASSESSMENT — CUP TO DISC RATIO
OD_RATIO: 0.35
OS_RATIO: 0.3

## 2023-02-21 ASSESSMENT — EXTERNAL EXAM - RIGHT EYE: OD_EXAM: NORMAL

## 2023-02-21 NOTE — PROGRESS NOTES
Chief Complaint   Patient presents with     Annual Eye Exam     New Eval For Contact Lens        Previous contact lens wearer? Yes: Has worn CL's in the past but has a very strong allergy to silicone and latex    -Was able to wear a soft CL but is unsure what brand/material they were made of - would like to discuss wearing CL's again if there is a brand we have that won't irritate her eyes     -Does not want to do CL fitting at this time as she doesn't want to pay the $75 fitting but would like to know if there are any should could wear in future    Last Eye Exam: 5/5/2021  Dilated Previously: Yes, side effects of dilation explained today    What are you currently using to see?  Glasses - FT BF - wears full time     Distance Vision Acuity: Satisfied with vision    Near Vision Acuity: Not satisfied - takes glasses off for most things up close     Eye Comfort: good  Do you use eye drops? : No  Occupation or Hobbies: [employer]     Medical, surgical and family histories reviewed and updated 2/21/2023.       OBJECTIVE: See Ophthalmology exam    ASSESSMENT:    ICD-10-CM    1. Encounter for examination of eyes and vision without abnormal findings  Z01.00 EYE EXAM (SIMPLE-NONBILLABLE)      2. Myopia of both eyes  H52.13 EYE EXAM (SIMPLE-NONBILLABLE)     REFRACTION      3. Regular astigmatism of both eyes  H52.223 EYE EXAM (SIMPLE-NONBILLABLE)     REFRACTION      4. Presbyopia  H52.4 EYE EXAM (SIMPLE-NONBILLABLE)     REFRACTION         PLAN:     Patient Instructions   Elly was advised of today's exam findings.  Fill glasses prescription  Allow 2 weeks to adapt to change in glasses  Wear glasses full time  Copy of glasses Rx provided today.    If you decide you would like to try some contact lenses, let me know and I will order the lenses for you. Then once the lenses arrive to our clinic we'll call you to set up a follow-up appointment.     The effects of the dilating drops last for 4- 6 hours.  You will be  more sensitive to light and vision will be blurry up close.  Mydriatic sunglasses were given if needed.       Carter Conteh O.D.  [address]  [place]  [address]    [phone]

## 2023-02-21 NOTE — LETTER
2/21/2023         RE: Elly Golden  [address] [facility] [address]        Dear Colleague,    Thank you for referring your patient, Elly Golden, to the [facility]. Please see a copy of my visit note below.    Chief Complaint   Patient presents with     Annual Eye Exam     New Eval For Contact Lens        Previous contact lens wearer? Yes: Has worn CL's in the past but has a very strong allergy to silicone and latex    -Was able to wear a soft CL but is unsure what brand/material they were made of - would like to discuss wearing CL's again if there is a brand we have that won't irritate her eyes     -Does not want to do CL fitting at this time as she doesn't want to pay the $75 fitting but would like to know if there are any should could wear in future    Last Eye Exam: 5/5/2021  Dilated Previously: Yes, side effects of dilation explained today    What are you currently using to see?  Glasses - FT BF - wears full time     Distance Vision Acuity: Satisfied with vision    Near Vision Acuity: Not satisfied - takes glasses off for most things up close     Eye Comfort: good  Do you use eye drops? : No  Occupation or Hobbies: [employer]     Medical, surgical and family histories reviewed and updated 2/21/2023.       OBJECTIVE: See Ophthalmology exam    ASSESSMENT:    ICD-10-CM    1. Encounter for examination of eyes and vision without abnormal findings  Z01.00 EYE EXAM (SIMPLE-NONBILLABLE)      2. Myopia of both eyes  H52.13 EYE EXAM (SIMPLE-NONBILLABLE)     REFRACTION      3. Regular astigmatism of both eyes  H52.223 EYE EXAM (SIMPLE-NONBILLABLE)     REFRACTION      4. Presbyopia  H52.4 EYE EXAM (SIMPLE-NONBILLABLE)     REFRACTION         PLAN:     Patient Instructions   Elly was advised of today's exam findings.  Fill glasses prescription  Allow 2 weeks to adapt to change in glasses  Wear glasses full time  Copy of glasses Rx provided today.    If you decide you  would like to try some contact lenses, let me know and I will order the lenses for you. Then once the lenses arrive to our clinic we'll call you to set up a follow-up appointment.     The effects of the dilating drops last for 4- 6 hours.  You will be more sensitive to light and vision will be blurry up close.  Mydriatic sunglasses were given if needed.       Carter Conteh O.D.  [address]  [address]    [phone]                 Again, thank you for allowing me to participate in the care of your patient.        Sincerely,        Carter Conteh, OD

## 2023-02-21 NOTE — PATIENT INSTRUCTIONS
Elly was advised of today's exam findings.  Fill glasses prescription  Allow 2 weeks to adapt to change in glasses  Wear glasses full time  Copy of glasses Rx provided today.    If you decide you would like to try some contact lenses, let me know and I will order the lenses for you. Then once the lenses arrive to our clinic we'll call you to set up a follow-up appointment.     The effects of the dilating drops last for 4- 6 hours.  You will be more sensitive to light and vision will be blurry up close.  Mydriatic sunglasses were given if needed.       Carter Conteh O.D.  [address] [place]  [address]    [phone]

## 2023-03-03 ENCOUNTER — APPOINTMENT (OUTPATIENT)
Dept: OPTOMETRY | Facility: CLINIC | Age: 53
End: 2023-03-03
Payer: COMMERCIAL

## 2023-03-03 PROCEDURE — 92341 FIT SPECTACLES BIFOCAL: CPT | Performed by: OPTOMETRIST

## 2023-05-01 ENCOUNTER — MYC MEDICAL ADVICE (OUTPATIENT)
Dept: FAMILY MEDICINE | Facility: CLINIC | Age: 53
End: 2023-05-01
Payer: COMMERCIAL

## 2023-05-01 DIAGNOSIS — Z11.1 SCREENING EXAMINATION FOR PULMONARY TUBERCULOSIS: Primary | ICD-10-CM

## 2023-05-04 ENCOUNTER — LAB (OUTPATIENT)
Dept: LAB | Facility: CLINIC | Age: 53
End: 2023-05-04
Payer: COMMERCIAL

## 2023-05-04 DIAGNOSIS — Z11.1 SCREENING EXAMINATION FOR PULMONARY TUBERCULOSIS: ICD-10-CM

## 2023-05-04 PROCEDURE — 86481 TB AG RESPONSE T-CELL SUSP: CPT

## 2023-05-04 PROCEDURE — 36415 COLL VENOUS BLD VENIPUNCTURE: CPT

## 2023-05-07 LAB
QUANTIFERON NIL TUBE: 0 IU/ML
QUANTIFERON TB1 TUBE: 0.02 IU/ML

## 2023-05-08 LAB
GAMMA INTERFERON BACKGROUND BLD IA-ACNC: 0 IU/ML
M TB IFN-G BLD-IMP: NEGATIVE
M TB IFN-G CD4+ BCKGRND COR BLD-ACNC: 10 IU/ML
MITOGEN IGNF BCKGRD COR BLD-ACNC: 0.02 IU/ML
MITOGEN IGNF BCKGRD COR BLD-ACNC: 0.06 IU/ML
QUANTIFERON MITOGEN: 10 IU/ML
QUANTIFERON TB2 TUBE: 0.06

## 2023-06-28 NOTE — PROGRESS NOTES
"AUDIOLOGY REPORT:    Patient was referred to [facility] from ENT by Dr. Stearns for a hearing examination. Patient reports extreme difficulty hearing/communicating when in the present of background noise. Patient reports she has \"inherited her grandfather's hearing loss\". Patient was unaccompanied to today's visit.     Testing:    Otoscopy:   Otoscopic exam indicates ears are clear of cerumen bilaterally     Tympanograms:    RIGHT: normal eardrum mobility     LEFT:   normal eardrum mobility    Reflexes (reported by stimulus ear): 1000 Hz  RIGHT: Ipsilateral is present at normal levels  RIGHT: Contralateral is present at normal levels  LEFT:   Ipsilateral is present at normal levels  LEFT:   Contralateral is present at normal levels    Thresholds:   Pure Tone Thresholds assessed using conventional audiometry with good  reliability from 250-8000 Hz bilaterally using insert earphones     RIGHT:  normal and borderline-normal hearing sensitivity for all frequencies tested.     LEFT:    normal and borderline-normal hearing sensitivity for all frequencies tested.     Speech Reception Threshold:    RIGHT: 15 dB HL    LEFT:   15 dB HL    Word Recognition Score:     RIGHT: 100% at 55 dB HL using NU-6 recorded word list.    LEFT:   100% at 55 dB HL using NU-6 recorded word list.    Discussed results with the patient.     Patient was returned to ENT for follow up.     Wilmar Hernandes CCC-A  Licensed Audiologist  5/21/2021                                            " 29-Jun-2023 00:12

## 2023-12-01 ASSESSMENT — ENCOUNTER SYMPTOMS
BREAST MASS: 0
HEADACHES: 1
WEAKNESS: 1
NERVOUS/ANXIOUS: 1
ARTHRALGIAS: 1

## 2023-12-01 NOTE — COMMUNITY RESOURCES LIST (ENGLISH)
12/01/2023   [facility]  N/A  For questions about this resource list or additional care needs, please contact your primary care clinic or care manager.  Phone: [phone]   Email: N/A   Address: [address] [place] [address]   Hours: N/A        Hotlines and Helplines       Hotline - Housing crisis  1  [facility] Distance: 1.86 miles      Phone/Virtual   [address] [place] [address]  Language: English  Hours: Mon - Sun Open 24 Hours   Phone: (553) 884-3111 Email: communications@[facility]-[place].org Website: https://oscs-[place].org/oursaviourshousing/     2  [facility] Distance: 2.69 miles      Phone/Virtual   [address] [place] [address]  Language: English  Hours: Mon - Sun Open 24 Hours  Fees: Free   Phone: (386) 131-7640 Email: info@saintola.Pitchbrite Website: http://www.saintolaf.Pitchbrite/          Housing       Coordinated Entry access point  3  [facility] [place] ([facility] - Housing Services Distance: 1.99 miles      In-Person   [address] [place] [address]  Language: English  Hours: Mon - Fri 9:00 AM - 5:00 PM  Fees: Free   Phone: (708) 899-6847 Email: housing@[facility].org Website: http://www.[facility].org/housing     4  [facility] - Adult intermediate [facility] Distance: 2.69 miles      In-Person   [address] [place] [address]  Language: English  Hours: Mon - Sat 10:00 PM - 5:00 PM  Fees: Free   Phone: (416) 769-3960 Email: info@[facility].Pitchbrite Website: http://www.saintolaf.org/     Drop-in center or day shelter  5  [facility] - [facility] Distance: 2.08 miles      In-Person   [address] [place] [address]  Language: English, Bulgarian, Danish  Hours: Mon - Sat 7:00 AM - 3:00 PM  Fees: Free, Self Pay   Phone: (710) 218-6491 Email: info@[employer].org Website: https://www.cctwincities.org/locations/opportunity-center/     6  [facility]  Distance: 2.24 miles      In-Person   [address] [place] [address]  Language: English  Hours: Mon - Fri 12:00 PM - 3:00 PM  Fees: Free   Phone: (694) 768-7916 Email: juniBettrLife@noodls.Pulselocker Website: http://Fugate.[facility].Pixelapse/     Housing search assistance  7  [facility] Office of Multicultural Services Distance: 0.65 miles      Phone/Virtual   [address] [place] [address]  Language: American Sign Language, Frisian, Nepali, English, Sinhala, Kinyarwanda, Oromo, Latvian, Cymraes, Tamazight, Swahili, Indonesian, Chinese  Hours: Mon - Tue 9:00 AM - 4:00 PM , Wed 10:00 AM - 5:00 PM , Thu - Fri 9:00 AM - 4:00 PM  Fees: Free   Phone: (749) 855-6606 Email: oms@[facility] Website: http://www.[facility]/residents/human-services/multi-cultural-services     8  [facility] ([facility] Distance: 1.57 miles      In-Person   [address] [place] [address]  Language: English, Cymraes, Tamazight  Hours: Mon - Fri 8:30 AM - 4:30 PM  Fees: Free   Phone: (178) 347-4867 Email: kelli@[facility].org Website: http://www.[facility].org/     Shelter for families  9  [facility] Distance: 18.3 miles      In-Person   [address] [place] [address]  Language: English  Hours: Mon - Fri 3:00 PM - 9:00 AM , Sat - Sun Open 24 Hours  Fees: Free   Phone: (632) 776-2320 Ext.1 Website: https://www.saintandrews.org/2020/07/03/emergency-family-shelter/     Shelter for individuals  10  [facility] Distance: 1.86 miles      In-Person   [address] [place] [address]  Language: English  Hours: Mon - Sun Open 24 Hours  Fees: Free   Phone: (489) 977-6677 Email: communications@[facility].org Website: https://oscs-[place].org/oursaviourshousing/     11  [facility] Distance: 3.22 miles      In-Person   [address] [place] [address]  Language: English  Hours: Mon - Fri 4:00 PM - 9:00 AM  Fees: Free    Phone: (561) 892-4622 Email: krysta@[facility].[employer].org Website: https://[facility].org/[facility] Cent[facility]/          Important Numbers & Websites       Emergency Services   911  [facility] Services   311  Poison Control   (416) 576-9753  Suicide Prevention Lifeline   (327) 173-1676 (TALK)  Child Abuse Hotline   (596) 410-3682 (4-A-Child)  Sexual Assault Hotline   (573) 532-7848 (HOPE)  National Runaway Safeline   (622) 217-8362 (RUNAWAY)  All-Options Talkline   (469) 491-7395  Substance Abuse Referral   (837) 605-6575 (HELP)

## 2023-12-08 ENCOUNTER — OFFICE VISIT (OUTPATIENT)
Dept: FAMILY MEDICINE | Facility: CLINIC | Age: 53
End: 2023-12-08
Payer: COMMERCIAL

## 2023-12-08 VITALS
HEIGHT: 64 IN | DIASTOLIC BLOOD PRESSURE: 70 MMHG | HEART RATE: 58 BPM | TEMPERATURE: 97 F | SYSTOLIC BLOOD PRESSURE: 123 MMHG | OXYGEN SATURATION: 99 % | WEIGHT: 212 LBS | RESPIRATION RATE: 18 BRPM | BODY MASS INDEX: 36.19 KG/M2

## 2023-12-08 DIAGNOSIS — E78.5 HYPERLIPIDEMIA LDL GOAL <100: ICD-10-CM

## 2023-12-08 DIAGNOSIS — E55.9 VITAMIN D DEFICIENCY: Primary | ICD-10-CM

## 2023-12-08 DIAGNOSIS — M19.90 INFLAMMATORY ARTHRITIS: ICD-10-CM

## 2023-12-08 DIAGNOSIS — M60.9 MYOSITIS, UNSPECIFIED MYOSITIS TYPE, UNSPECIFIED SITE: ICD-10-CM

## 2023-12-08 LAB
ANION GAP SERPL CALCULATED.3IONS-SCNC: 11 MMOL/L (ref 7–15)
BASOPHILS # BLD AUTO: 0 10E3/UL (ref 0–0.2)
BASOPHILS NFR BLD AUTO: 1 %
BUN SERPL-MCNC: 11 MG/DL (ref 6–20)
CALCIUM SERPL-MCNC: 9.5 MG/DL (ref 8.6–10)
CHLORIDE SERPL-SCNC: 102 MMOL/L (ref 98–107)
CHOLEST SERPL-MCNC: 150 MG/DL
CREAT SERPL-MCNC: 0.78 MG/DL (ref 0.51–0.95)
DEPRECATED HCO3 PLAS-SCNC: 25 MMOL/L (ref 22–29)
EGFRCR SERPLBLD CKD-EPI 2021: >90 ML/MIN/1.73M2
EOSINOPHIL # BLD AUTO: 0.1 10E3/UL (ref 0–0.7)
EOSINOPHIL NFR BLD AUTO: 1 %
ERYTHROCYTE [DISTWIDTH] IN BLOOD BY AUTOMATED COUNT: 12.6 % (ref 10–15)
ERYTHROCYTE [SEDIMENTATION RATE] IN BLOOD BY WESTERGREN METHOD: 9 MM/HR (ref 0–30)
FASTING STATUS PATIENT QL REPORTED: YES
GLUCOSE SERPL-MCNC: 91 MG/DL (ref 70–99)
HCT VFR BLD AUTO: 43.7 % (ref 35–47)
HDLC SERPL-MCNC: 56 MG/DL
HGB BLD-MCNC: 14.1 G/DL (ref 11.7–15.7)
IMM GRANULOCYTES # BLD: 0 10E3/UL
IMM GRANULOCYTES NFR BLD: 0 %
LDLC SERPL CALC-MCNC: 70 MG/DL
LYMPHOCYTES # BLD AUTO: 2.5 10E3/UL (ref 0.8–5.3)
LYMPHOCYTES NFR BLD AUTO: 32 %
MCH RBC QN AUTO: 29.8 PG (ref 26.5–33)
MCHC RBC AUTO-ENTMCNC: 32.3 G/DL (ref 31.5–36.5)
MCV RBC AUTO: 92 FL (ref 78–100)
MONOCYTES # BLD AUTO: 0.6 10E3/UL (ref 0–1.3)
MONOCYTES NFR BLD AUTO: 8 %
NEUTROPHILS # BLD AUTO: 4.5 10E3/UL (ref 1.6–8.3)
NEUTROPHILS NFR BLD AUTO: 59 %
NONHDLC SERPL-MCNC: 94 MG/DL
PLATELET # BLD AUTO: 303 10E3/UL (ref 150–450)
POTASSIUM SERPL-SCNC: 4.3 MMOL/L (ref 3.4–5.3)
RBC # BLD AUTO: 4.73 10E6/UL (ref 3.8–5.2)
SODIUM SERPL-SCNC: 138 MMOL/L (ref 135–145)
T4 FREE SERPL-MCNC: 1.15 NG/DL (ref 0.9–1.7)
TRIGL SERPL-MCNC: 119 MG/DL
TSH SERPL DL<=0.005 MIU/L-ACNC: 6.94 UIU/ML (ref 0.3–4.2)
VIT D+METAB SERPL-MCNC: 27 NG/ML (ref 20–50)
WBC # BLD AUTO: 7.6 10E3/UL (ref 4–11)

## 2023-12-08 PROCEDURE — 80048 BASIC METABOLIC PNL TOTAL CA: CPT | Performed by: INTERNAL MEDICINE

## 2023-12-08 PROCEDURE — 85025 COMPLETE CBC W/AUTO DIFF WBC: CPT | Performed by: INTERNAL MEDICINE

## 2023-12-08 PROCEDURE — 86617 LYME DISEASE ANTIBODY: CPT | Performed by: INTERNAL MEDICINE

## 2023-12-08 PROCEDURE — 86618 LYME DISEASE ANTIBODY: CPT | Performed by: INTERNAL MEDICINE

## 2023-12-08 PROCEDURE — 85652 RBC SED RATE AUTOMATED: CPT | Performed by: INTERNAL MEDICINE

## 2023-12-08 PROCEDURE — 82306 VITAMIN D 25 HYDROXY: CPT | Performed by: INTERNAL MEDICINE

## 2023-12-08 PROCEDURE — 84439 ASSAY OF FREE THYROXINE: CPT | Performed by: INTERNAL MEDICINE

## 2023-12-08 PROCEDURE — 84443 ASSAY THYROID STIM HORMONE: CPT | Performed by: INTERNAL MEDICINE

## 2023-12-08 PROCEDURE — 80061 LIPID PANEL: CPT | Performed by: INTERNAL MEDICINE

## 2023-12-08 PROCEDURE — 99396 PREV VISIT EST AGE 40-64: CPT | Performed by: INTERNAL MEDICINE

## 2023-12-08 PROCEDURE — 36415 COLL VENOUS BLD VENIPUNCTURE: CPT | Performed by: INTERNAL MEDICINE

## 2023-12-08 PROCEDURE — 99214 OFFICE O/P EST MOD 30 MIN: CPT | Mod: 25 | Performed by: INTERNAL MEDICINE

## 2023-12-08 ASSESSMENT — PATIENT HEALTH QUESTIONNAIRE - PHQ9
SUM OF ALL RESPONSES TO PHQ QUESTIONS 1-9: 9
10. IF YOU CHECKED OFF ANY PROBLEMS, HOW DIFFICULT HAVE THESE PROBLEMS MADE IT FOR YOU TO DO YOUR WORK, TAKE CARE OF THINGS AT HOME, OR GET ALONG WITH OTHER PEOPLE: SOMEWHAT DIFFICULT
SUM OF ALL RESPONSES TO PHQ QUESTIONS 1-9: 9

## 2023-12-08 ASSESSMENT — ENCOUNTER SYMPTOMS
BREAST MASS: 0
WEAKNESS: 1
HEADACHES: 1
ARTHRALGIAS: 1
NERVOUS/ANXIOUS: 1

## 2023-12-08 NOTE — COMMUNITY RESOURCES LIST (ENGLISH)
12/08/2023   [facility]  N/A  For questions about this resource list or additional care needs, please contact your primary care clinic or care manager.  Phone: [phone]   Email: N/A   Address: [address] [place] [address]   Hours: N/A        Hotlines and Helplines       Hotline - Housing crisis  1  [facility] Distance: 1.86 miles      Phone/Virtual   [address] [place] [address]  Language: English  Hours: Mon - Sun Open 24 Hours   Phone: (304) 457-1324 Email: communications@[facility]-[place].org Website: https://oscs-[place].org/oursaviourshousing/     2  [facility] Distance: 2.69 miles      Phone/Virtual   [address] [place] [address]  Language: English  Hours: Mon - Sun Open 24 Hours  Fees: Free   Phone: (133) 235-8880 Email: info@[employer].Bahoui Website: http://www.saintolaf.Bahoui/          Housing       Coordinated Entry access point  3  [facility] [place] ([facility] - Housing Services Distance: 1.99 miles      In-Person   [address] [place] [address]  Language: English  Hours: Mon - Fri 9:00 AM - 5:00 PM  Fees: Free   Phone: (871) 344-2832 Email: housing@[facility].org Website: http://www.[facility].org/housing     4  [facility] - Adult retirement [facility] Distance: 2.69 miles      In-Person   [address] [place] [address]  Language: English  Hours: Mon - Sat 10:00 PM - 5:00 PM  Fees: Free   Phone: (197) 290-6404 Email: info@saintolaf.Bahoui Website: http://www.saintolaf.org/     Drop-in center or day shelter  5  [facility] - [facility] Distance: 2.08 miles      In-Person   [address] [place] [address]  Language: English, Mauritanian, Bengali  Hours: Mon - Sat 7:00 AM - 3:00 PM  Fees: Free, Self Pay   Phone: (958) 456-2567 Email: info@[employer].org Website: https://www.cctwincities.org/locations/opportunity-center/     6  [facility]  Distance: 2.24 miles      In-Person   [address] [place] [address]  Language: English  Hours: Mon - Fri 12:00 PM - 3:00 PM  Fees: Free   Phone: (953) 918-9616 Email: juni[employer]an[employer].com Website: http://Iceni TechnologyCritical access hospital.KwiClick/     Housing search assistance  7  [facility] Office of Multicultural Services Distance: 0.65 miles      Phone/Virtual   [address] [place] [address]  Language: American Sign Language, Divehi, Nepali, English, Pashto, Ukrainian, Oromo, Dutch, Sammarinese, Divehi, Swahili, Portuguese, Ukrainian  Hours: Mon - Tue 9:00 AM - 4:00 PM , Wed 10:00 AM - 5:00 PM , Thu - Fri 9:00 AM - 4:00 PM  Fees: Free   Phone: (928) 463-4693 Email: oms@[facility] Website: http://www.[facility]/residents/human-services/multi-cultural-services     8  [facility] ([facility] Distance: 1.57 miles      In-Person   [address] [place] [address]  Language: English, Sammarinese, Divehi  Hours: Mon - Fri 8:30 AM - 4:30 PM  Fees: Free   Phone: (911) 847-3142 Email: kelli@[facility].org Website: http://www.[facility].org/     Shelter for families  9  [facility] Distance: 18.3 miles      In-Person   [address] [place] [address]  Language: English  Hours: Mon - Fri 3:00 PM - 9:00 AM , Sat - Sun Open 24 Hours  Fees: Free   Phone: (434) 152-3153 Ext.1 Website: https://www.saintandrews.org/2020/07/03/emergency-family-shelter/     Shelter for individuals  10  [facility] Distance: 1.86 miles      In-Person   [address] [place] [address]  Language: English  Hours: Mon - Sun Open 24 Hours  Fees: Free   Phone: (474) 797-4584 Email: communications@[facility].org Website: https://oscs-[place].org/oursaviourshousing/     11  [facility] Distance: 3.22 miles      In-Person   [address] [place] [address]  Language: English  Hours: Mon - Fri 4:00 PM - 9:00 AM  Fees: Free    Phone: (580) 873-9686 Email: krysta@[facility].[employer]ovia.org Website: https://[facility] Cent[facility].org/[facility] Cent[facility]rer/          Important Numbers & Websites       Emergency Services   911  [facility] Services   311  Poison Control   (764) 191-2489  Suicide Prevention Lifeline   (674) 995-7521 (TALK)  Child Abuse Hotline   (392) 787-9241 (4-A-Child)  Sexual Assault Hotline   (730) 185-1764 (HOPE)  National Runaway Safeline   (629) 593-6650 (RUNAWAY)  All-Options Talkline   (997) 430-4931  Substance Abuse Referral   (387) 281-9815 (HELP)

## 2023-12-08 NOTE — PROGRESS NOTES
SUBJECTIVE:   Mo is a 53 year old, presenting for the following:  Physical        12/8/2023    11:35 AM   Additional Questions   Roomed by Roseanna       Healthy Habits:     Getting at least 3 servings of Calcium per day:  NO    Bi-annual eye exam:  Yes    Dental care twice a year:  NO    Sleep apnea or symptoms of sleep apnea:  None    Diet:  Regular (no restrictions)    Frequency of exercise:  None    Taking medications regularly:  No    Barriers to taking medications:  Other    Medication side effects:  Other    Additional concerns today:  Yes    Energy is down, depleted, joints hurt, back hurts, carpal tunnel   Fingers get cold and hands stiff, not    Pain in the base of the thumb     Lost footing and fell down   Popping and foot cramps  Swelling and pain     Today's PHQ-9 Score:       12/8/2023     7:39 AM   PHQ-9 SCORE   PHQ-9 Total Score MyChart 9 (Mild depression)   PHQ-9 Total Score 9    nortryltine    Trazodone   Cymbalta                 Social History     Tobacco Use    Smoking status: Never     Passive exposure: Never    Smokeless tobacco: Never   Substance Use Topics    Alcohol use: No             12/1/2023    10:54 AM   Alcohol Use   Prescreen: >3 drinks/day or >7 drinks/week? Not Applicable     Reviewed orders with patient.  Reviewed health maintenance and updated orders accordingly - Yes  Lab work is in process  BP Readings from Last 3 Encounters:   12/08/23 123/70   12/23/22 127/74   06/01/21 112/77    Wt Readings from Last 3 Encounters:   12/08/23 96.2 kg (212 lb)   12/23/22 96.2 kg (212 lb)   06/01/21 100.2 kg (220 lb 12.8 oz)                    Breast Cancer Screening:    FHS-7:       1/27/2023    10:22 AM   Breast CA Risk Assessment (FHS-7)   Did any of your first-degree relatives have breast or ovarian cancer? Unknown   Did any of your relatives have bilateral breast cancer? Unknown   Did any man in your family have breast cancer? Unknown   Did any woman in your family have breast and ovarian  cancer? Unknown   Did any woman in your family have breast cancer before age 50 y? Unknown   Do you have 2 or more relatives with breast and/or ovarian cancer? Unknown   Do you have 2 or more relatives with breast and/or bowel cancer? Unknown     click delete button to remove this line now  Mammogram Screening: Recommended annual mammography  Pertinent mammograms are reviewed under the imaging tab.    History of abnormal Pap smear: NO - age 30-65 PAP every 5 years with negative HPV co-testing recommended      Latest Ref Rng & Units 4/22/2021     5:09 PM 4/22/2021     9:00 AM 7/26/2017    12:00 AM   PAP / HPV   PAP (Historical)  NIL      HPV 16 DNA NEG^Negative  Negative     HPV 18 DNA NEG^Negative  Negative     Other HR HPV NEG^Negative  Negative     PAP-ABSTRACT    See Scanned Document           This result is from an external source.     Reviewed and updated as needed this visit by clinical staff   Tobacco  Allergies  Meds              Reviewed and updated as needed this visit by Provider                     Review of Systems   Breasts:  Negative for tenderness, breast mass and discharge.   Genitourinary:  Negative for pelvic pain, vaginal bleeding and vaginal discharge.   Musculoskeletal:  Positive for arthralgias.   Neurological:  Positive for weakness and headaches.   Psychiatric/Behavioral:  The patient is nervous/anxious.      CONSTITUTIONAL: NEGATIVE for fever, chills, change in weight  INTEGUMENTARU/SKIN: NEGATIVE for worrisome rashes, moles or lesions  EYES: NEGATIVE for vision changes or irritation  ENT: NEGATIVE for ear, mouth and throat problems  RESP: NEGATIVE for significant cough or SOB  BREAST: NEGATIVE for masses, tenderness or discharge  CV: NEGATIVE for chest pain, palpitations or peripheral edema  GI: NEGATIVE for nausea, abdominal pain, heartburn, or change in bowel habits  : NEGATIVE for unusual urinary or vaginal symptoms. Periods are regular.  MUSCULOSKELETAL: NEGATIVE for significant  "arthralgias or myalgia  NEURO: NEGATIVE for weakness, dizziness or paresthesias  PSYCHIATRIC: NEGATIVE for changes in mood or affect     OBJECTIVE:   /70 (BP Location: Right arm, Patient Position: Chair, Cuff Size: Adult Large)   Pulse 58   Temp 97  F (36.1  C)   Resp 18   Ht 1.628 m (5' 4.1\")   Wt 96.2 kg (212 lb)   LMP  (LMP Unknown)   SpO2 99%   BMI 36.28 kg/m    Physical Exam  GENERAL: healthy, alert and no distress  EYES: Eyes grossly normal to inspection, PERRL and conjunctivae and sclerae normal  HENT: ear canals and TM's normal, nose and mouth without ulcers or lesions  NECK: no adenopathy, no asymmetry, masses, or scars and thyroid normal to palpation  RESP: lungs clear to auscultation - no rales, rhonchi or wheezes  CV: regular rate and rhythm, normal S1 S2, no S3 or S4, no murmur, click or rub, no peripheral edema and peripheral pulses strong  ABDOMEN: soft, nontender, no hepatosplenomegaly, no masses and bowel sounds normal  MS: no gross musculoskeletal defects noted, no edema  SKIN: no suspicious lesions or rashes  NEURO: Normal strength and tone, mentation intact and speech normal  BACK: no CVA tenderness, no paralumbar tenderness  PSYCH: mentation appears normal, affect normal/bright  LYMPH: no cervical, supraclavicular, axillary, or inguinal adenopathy    Diagnostic Test Results:  Labs reviewed in Epic  Results for orders placed or performed in visit on 12/08/23   Basic metabolic panel  (Ca, Cl, CO2, Creat, Gluc, K, Na, BUN)     Status: Normal   Result Value Ref Range    Sodium 138 135 - 145 mmol/L    Potassium 4.3 3.4 - 5.3 mmol/L    Chloride 102 98 - 107 mmol/L    Carbon Dioxide (CO2) 25 22 - 29 mmol/L    Anion Gap 11 7 - 15 mmol/L    Urea Nitrogen 11.0 6.0 - 20.0 mg/dL    Creatinine 0.78 0.51 - 0.95 mg/dL    GFR Estimate >90 >60 mL/min/1.73m2    Calcium 9.5 8.6 - 10.0 mg/dL    Glucose 91 70 - 99 mg/dL   Lipid panel reflex to direct LDL Fasting     Status: None   Result Value Ref " Range    Cholesterol 150 <200 mg/dL    Triglycerides 119 <150 mg/dL    Direct Measure HDL 56 >=50 mg/dL    LDL Cholesterol Calculated 70 <=100 mg/dL    Non HDL Cholesterol 94 <130 mg/dL    Patient Fasting > 8hrs? Yes     Narrative    Cholesterol  Desirable:  <200 mg/dL    Triglycerides  Normal:  Less than 150 mg/dL  Borderline High:  150-199 mg/dL  High:  200-499 mg/dL  Very High:  Greater than or equal to 500 mg/dL    Direct Measure HDL  Female:  Greater than or equal to 50 mg/dL   Male:  Greater than or equal to 40 mg/dL    LDL Cholesterol  Desirable:  <100mg/dL  Above Desirable:  100-129 mg/dL   Borderline High:  130-159 mg/dL   High:  160-189 mg/dL   Very High:  >= 190 mg/dL    Non HDL Cholesterol  Desirable:  130 mg/dL  Above Desirable:  130-159 mg/dL  Borderline High:  160-189 mg/dL  High:  190-219 mg/dL  Very High:  Greater than or equal to 220 mg/dL   ESR: Erythrocyte sedimentation rate     Status: Normal   Result Value Ref Range    Erythrocyte Sedimentation Rate 9 0 - 30 mm/hr   Lyme Disease Total Abs Bld with Reflex to Confirm CLIA     Status: Abnormal   Result Value Ref Range    Lyme Disease Antibodies Total 2.01 (H) <0.90   Vitamin D Deficiency     Status: Normal   Result Value Ref Range    Vitamin D, Total (25-Hydroxy) 27 20 - 50 ng/mL    Narrative    Season, race, dietary intake, and treatment affect the concentration of 25-hydroxy-Vitamin D. Values may decrease during winter months and increase during summer months.    Vitamin D determination is routinely performed by an immunoassay specific for 25 hydroxyvitamin D3.  If an individual is on vitamin D2(ergocalciferol) supplementation, please specify 25 OH vitamin D2 and D3 level determination by LCMSMS test VITD23.     TSH with free T4 reflex     Status: Abnormal   Result Value Ref Range    TSH 6.94 (H) 0.30 - 4.20 uIU/mL   CBC with platelets and differential     Status: None   Result Value Ref Range    WBC Count 7.6 4.0 - 11.0 10e3/uL    RBC Count  4.73 3.80 - 5.20 10e6/uL    Hemoglobin 14.1 11.7 - 15.7 g/dL    Hematocrit 43.7 35.0 - 47.0 %    MCV 92 78 - 100 fL    MCH 29.8 26.5 - 33.0 pg    MCHC 32.3 31.5 - 36.5 g/dL    RDW 12.6 10.0 - 15.0 %    Platelet Count 303 150 - 450 10e3/uL    % Neutrophils 59 %    % Lymphocytes 32 %    % Monocytes 8 %    % Eosinophils 1 %    % Basophils 1 %    % Immature Granulocytes 0 %    Absolute Neutrophils 4.5 1.6 - 8.3 10e3/uL    Absolute Lymphocytes 2.5 0.8 - 5.3 10e3/uL    Absolute Monocytes 0.6 0.0 - 1.3 10e3/uL    Absolute Eosinophils 0.1 0.0 - 0.7 10e3/uL    Absolute Basophils 0.0 0.0 - 0.2 10e3/uL    Absolute Immature Granulocytes 0.0 <=0.4 10e3/uL   T4 free     Status: Normal   Result Value Ref Range    Free T4 1.15 0.90 - 1.70 ng/dL   CBC with platelets and differential     Status: None    Narrative    The following orders were created for panel order CBC with platelets and differential.  Procedure                               Abnormality         Status                     ---------                               -----------         ------                     CBC with platelets and d...[[id number]]                      Final result                 Please view results for these tests on the individual orders.       ASSESSMENT/PLAN:   Mo was seen today for physical.    Diagnoses and all orders for this visit:    Vitamin D deficiency  -     Vitamin D Deficiency; Future  -     Vitamin D Deficiency    Myositis, unspecified myositis type, unspecified site  -     Basic metabolic panel  (Ca, Cl, CO2, Creat, Gluc, K, Na, BUN); Future  -     CBC with platelets and differential; Future  -     ESR: Erythrocyte sedimentation rate; Future  -     Lyme Disease Total Abs Bld with Reflex to Confirm CLIA; Future  -     Basic metabolic panel  (Ca, Cl, CO2, Creat, Gluc, K, Na, BUN)  -     CBC with platelets and differential  -     ESR: Erythrocyte sedimentation rate  -     Lyme Disease Total Abs Bld with Reflex to Confirm CLIA  -     LYME  "CONFIRM IGG/IGM BY CHEMILUMINESCENT IA BLOOD    Inflammatory arthritis  -     Basic metabolic panel  (Ca, Cl, CO2, Creat, Gluc, K, Na, BUN); Future  -     CBC with platelets and differential; Future  -     ESR: Erythrocyte sedimentation rate; Future  -     Lyme Disease Total Abs Bld with Reflex to Confirm CLIA; Future  -     TSH with free T4 reflex; Future  -     Basic metabolic panel  (Ca, Cl, CO2, Creat, Gluc, K, Na, BUN)  -     CBC with platelets and differential  -     ESR: Erythrocyte sedimentation rate  -     Lyme Disease Total Abs Bld with Reflex to Confirm CLIA  -     TSH with free T4 reflex  -     T4 free  -     LYME CONFIRM IGG/IGM BY CHEMILUMINESCENT IA BLOOD    Hyperlipidemia LDL goal <100  -     Lipid panel reflex to direct LDL Fasting; Future  -     TSH with free T4 reflex; Future  -     Lipid panel reflex to direct LDL Fasting  -     TSH with free T4 reflex  -     T4 free    Other orders  -     REVIEW OF HEALTH MAINTENANCE PROTOCOL ORDERS              COUNSELING:  Reviewed preventive health counseling, as reflected in patient instructions       Regular exercise       Healthy diet/nutrition       Vision screening       Hearing screening       Colorectal Cancer Screening      BMI:   Estimated body mass index is 36.28 kg/m  as calculated from the following:    Height as of this encounter: 1.628 m (5' 4.1\").    Weight as of this encounter: 96.2 kg (212 lb).   Weight management plan: Discussed healthy diet and exercise guidelines      She reports that she has never smoked. She has never been exposed to tobacco smoke. She has never used smokeless tobacco.          David Tracy MD  [facility]  "

## 2023-12-08 NOTE — COMMUNITY RESOURCES LIST (ENGLISH)
12/08/2023   [facility] [place] - Outpatient Clinics  N/A  For additional resource needs, please contact your health insurance member services or your primary care team.  Phone: [phone]   Email: N/A   Address: [facility] [place] [address]   Hours: N/A        Hotlines and Helplines       Hotline - Housing crisis  1  [facility] Distance: 1.86 miles      Phone/Virtual   [address] [place] [address]  Language: English  Hours: Mon - Sun Open 24 Hours   Phone: (441) 728-3512 Email: [email or web address] Website: [email or web address]-[email or web address]/[email or web address]     2  [facility] Distance: 2.69 miles      Phone/Virtual   [address] [place] [address]  Language: English  Hours: Mon - Sun Open 24 Hours  Fees: Free   Phone: (242) 981-3882 Email: info@[employer].org Website: http://www.saintolaf.org/          Housing       Coordinated Entry access point  3  [facility] [place] ([facility] Distance: 1.99 miles      In-Person   [address] [place] [address]  Language: English  Hours: Mon - Fri 9:00 AM - 5:00 PM  Fees: Free   Phone: (953) 665-7120 Email: [email or web address].org Website: http://[email or web address]/housing     4  [facility] - Adult senior care [facility] Distance: 2.69 miles      In-Person   [address] [place] [address]  Language: English  Hours: Mon - Sat 10:00 PM - 5:00 PM  Fees: Free   Phone: (388) 397-9207 Email: info@[facility].org Website: http://www.[facility].org/     Drop-in center or day shelter  5  [facility] Distance: 2.08 miles      In-Person   [address] [place] [address]  Language: English, Armenian, Belarusian  Hours: Mon - Sat 7:00 AM - 3:00 PM  Fees: Free, Self Pay   Phone: (834) 537-1215 Email: info@[employer].org Website: https://www.cctwincities.org/locations/opportunity-center/     6  [facility]  Distance: 2.24 miles      In-Person   [address] [place] [address]  Language: English  Hours: Mon - Fri 12:00 PM - 3:00 PM  Fees: Free   Phone: (269) 747-4248 Email: levMovetis@[employer] Website: http://[employer]/     Housing search assistance  7  [employer] Housing Online - https://Hedge[employer]/ Distance: 2.64 miles      Phone/Virtual   [address] [place] [address]  Language: English  Hours: Mon - Sun Open 24 Hours   Email: info@[employer] Website: https://Hedgeable     8  [employer] - Online housing search assistance Distance: 3.65 miles      Phone/Virtual   [address] [address]  Language: English, Hmong, Malaysian, Arabic  Hours: Mon - Sun Open 24 Hours   Phone: (102) 676-1311 Email: info@housinglink.org Website: http://www.housinglink.org/     Shelter for families  9  [facility] Distance: 18.3 miles      In-Person   [address] [place] [address]  Language: English  Hours: Mon - Fri 3:00 PM - 9:00 AM , Sat - Sun Open 24 Hours  Fees: Free   Phone: (543) 728-4898 Ext.1 Website: https://www.[facility]aint[facility]rews.org/2020/07/03/emergency-family-shelter/     Shelter for individuals  10  [facility] Distance: 1.86 miles      In-Person   [address] [place] [address]  Language: English  Hours: Mon - Sun Open 24 Hours  Fees: Free   Phone: (834) 352-5969 Email: communications@[facility]-[place].org Website: https://[facility]-[place].org/oursaviourshousing/     11  [facility] Distance: 3.22 miles      In-Person   [address] [place] [address]  Language: English  Hours: Mon - Fri 4:00 PM - 9:00 AM  Fees: Free   Phone: (352) 253-9387 Email: krysta@[facility].org Website: https://centralusa.salvationarmy.org/[facility]/          Important Numbers & Websites       [facility]   [address] [place] [address].[facility]  Poison Control   (827) 342-3456  [place].org  Suicide and Crisis Lifeline   988 988lifeline.org  Childhelp [place] Child Abuse Hotline   401.507.6321 Childhelphotline.org  [place] Sexual Assault Hotline   (741) 867-1551 (HOPE) Rainn.org  [place] Runaway Safeline   (479) 891-2125 (RUNAWAY) 1800runaway.org  Pregnancy & Postpartum Support [place]   Call/text 718-353-4680 Ppsupportmn.org  Substance Abuse National Helpline ([facility]   396-294-HELP (0722) Findtreatment.gov  Emergency Services   911

## 2023-12-10 LAB — B BURGDOR IGG+IGM SER QL: 2.01

## 2023-12-11 ENCOUNTER — MYC MEDICAL ADVICE (OUTPATIENT)
Dept: FAMILY MEDICINE | Facility: CLINIC | Age: 53
End: 2023-12-11
Payer: COMMERCIAL

## 2023-12-11 DIAGNOSIS — A69.20 LYME DISEASE: Primary | ICD-10-CM

## 2023-12-11 DIAGNOSIS — M79.18 MYOFASCIAL PAIN DYSFUNCTION SYNDROME: ICD-10-CM

## 2023-12-11 LAB
B BURGDOR IGG SERPL QL IA: 11.1 INDEX
B BURGDOR IGG SERPL QL IA: REACTIVE
B BURGDOR IGM SERPL QL IA: 0.26 INDEX
B BURGDOR IGM SERPL QL IA: NONREACTIVE

## 2023-12-12 RX ORDER — NORTRIPTYLINE HCL 10 MG
10-30 CAPSULE ORAL AT BEDTIME
Qty: 90 CAPSULE | Refills: 3 | Status: SHIPPED | OUTPATIENT
Start: 2023-12-12

## 2023-12-12 RX ORDER — DOXYCYCLINE 100 MG/1
100 CAPSULE ORAL 2 TIMES DAILY
Qty: 28 CAPSULE | Refills: 0 | Status: SHIPPED | OUTPATIENT
Start: 2023-12-12 | End: 2023-12-26

## 2023-12-28 ENCOUNTER — MYC MEDICAL ADVICE (OUTPATIENT)
Dept: FAMILY MEDICINE | Facility: CLINIC | Age: 53
End: 2023-12-28
Payer: COMMERCIAL

## 2025-01-12 ENCOUNTER — HEALTH MAINTENANCE LETTER (OUTPATIENT)
Age: 55
End: 2025-01-12

## 2025-04-05 ENCOUNTER — HEALTH MAINTENANCE LETTER (OUTPATIENT)
Age: 55
End: 2025-04-05